# Patient Record
Sex: FEMALE | NOT HISPANIC OR LATINO | URBAN - METROPOLITAN AREA
[De-identification: names, ages, dates, MRNs, and addresses within clinical notes are randomized per-mention and may not be internally consistent; named-entity substitution may affect disease eponyms.]

---

## 2023-02-02 ENCOUNTER — AMBULATORY SURGICAL CENTER (OUTPATIENT)
Dept: URBAN - METROPOLITAN AREA SURGERY 12 | Facility: SURGERY | Age: 43
End: 2023-02-02

## 2023-02-02 ENCOUNTER — OFFICE (OUTPATIENT)
Dept: URBAN - METROPOLITAN AREA PATHOLOGY 2 | Facility: PATHOLOGY | Age: 43
End: 2023-02-02
Payer: COMMERCIAL

## 2023-02-02 VITALS
RESPIRATION RATE: 16 BRPM | WEIGHT: 152 LBS | RESPIRATION RATE: 17 BRPM | HEART RATE: 107 BPM | SYSTOLIC BLOOD PRESSURE: 130 MMHG | RESPIRATION RATE: 17 BRPM | TEMPERATURE: 97.4 F | OXYGEN SATURATION: 99 % | SYSTOLIC BLOOD PRESSURE: 124 MMHG | DIASTOLIC BLOOD PRESSURE: 74 MMHG | DIASTOLIC BLOOD PRESSURE: 68 MMHG | HEIGHT: 65 IN | SYSTOLIC BLOOD PRESSURE: 126 MMHG | HEART RATE: 107 BPM | SYSTOLIC BLOOD PRESSURE: 135 MMHG | DIASTOLIC BLOOD PRESSURE: 76 MMHG | WEIGHT: 152 LBS | HEART RATE: 105 BPM | DIASTOLIC BLOOD PRESSURE: 70 MMHG | DIASTOLIC BLOOD PRESSURE: 88 MMHG | OXYGEN SATURATION: 97 % | SYSTOLIC BLOOD PRESSURE: 123 MMHG | HEART RATE: 84 BPM | RESPIRATION RATE: 16 BRPM | RESPIRATION RATE: 20 BRPM | DIASTOLIC BLOOD PRESSURE: 75 MMHG | HEART RATE: 109 BPM | SYSTOLIC BLOOD PRESSURE: 121 MMHG | DIASTOLIC BLOOD PRESSURE: 74 MMHG | OXYGEN SATURATION: 98 % | HEART RATE: 72 BPM | DIASTOLIC BLOOD PRESSURE: 70 MMHG | SYSTOLIC BLOOD PRESSURE: 118 MMHG | SYSTOLIC BLOOD PRESSURE: 126 MMHG | DIASTOLIC BLOOD PRESSURE: 76 MMHG | DIASTOLIC BLOOD PRESSURE: 78 MMHG | DIASTOLIC BLOOD PRESSURE: 75 MMHG | RESPIRATION RATE: 15 BRPM | TEMPERATURE: 97.4 F | SYSTOLIC BLOOD PRESSURE: 124 MMHG | SYSTOLIC BLOOD PRESSURE: 135 MMHG | OXYGEN SATURATION: 98 % | SYSTOLIC BLOOD PRESSURE: 118 MMHG | SYSTOLIC BLOOD PRESSURE: 130 MMHG | SYSTOLIC BLOOD PRESSURE: 111 MMHG | HEART RATE: 79 BPM | RESPIRATION RATE: 15 BRPM | DIASTOLIC BLOOD PRESSURE: 68 MMHG | DIASTOLIC BLOOD PRESSURE: 78 MMHG | HEART RATE: 105 BPM | SYSTOLIC BLOOD PRESSURE: 111 MMHG | HEART RATE: 83 BPM | HEART RATE: 79 BPM | SYSTOLIC BLOOD PRESSURE: 123 MMHG | HEIGHT: 65 IN | HEART RATE: 84 BPM | HEART RATE: 109 BPM | HEART RATE: 72 BPM | OXYGEN SATURATION: 99 % | SYSTOLIC BLOOD PRESSURE: 121 MMHG | DIASTOLIC BLOOD PRESSURE: 77 MMHG | RESPIRATION RATE: 20 BRPM | RESPIRATION RATE: 11 BRPM | DIASTOLIC BLOOD PRESSURE: 88 MMHG | HEART RATE: 83 BPM | DIASTOLIC BLOOD PRESSURE: 77 MMHG | OXYGEN SATURATION: 97 % | RESPIRATION RATE: 11 BRPM

## 2023-02-02 DIAGNOSIS — K31.7 POLYP OF STOMACH AND DUODENUM: ICD-10-CM

## 2023-02-02 DIAGNOSIS — R13.12 DYSPHAGIA, OROPHARYNGEAL PHASE: ICD-10-CM

## 2023-02-02 DIAGNOSIS — K21.00 GASTRO-ESOPHAGEAL REFLUX DISEASE WITH ESOPHAGITIS, WITHOUT B: ICD-10-CM

## 2023-02-02 DIAGNOSIS — K21.9 GASTRO-ESOPHAGEAL REFLUX DISEASE WITHOUT ESOPHAGITIS: ICD-10-CM

## 2023-02-02 DIAGNOSIS — K31.89 OTHER DISEASES OF STOMACH AND DUODENUM: ICD-10-CM

## 2023-02-02 PROBLEM — R11.0 NAUSEA: Status: ACTIVE | Noted: 2023-02-02

## 2023-02-02 PROBLEM — R19.4 CHANGE IN BOWEL HABIT: Status: ACTIVE | Noted: 2023-02-02

## 2023-02-02 PROCEDURE — 43450 DILATE ESOPHAGUS 1/MULT PASS: CPT | Performed by: INTERNAL MEDICINE

## 2023-02-02 PROCEDURE — 43239 EGD BIOPSY SINGLE/MULTIPLE: CPT | Mod: 59 | Performed by: INTERNAL MEDICINE

## 2023-02-02 PROCEDURE — 88342 IMHCHEM/IMCYTCHM 1ST ANTB: CPT | Performed by: PATHOLOGY

## 2023-02-02 PROCEDURE — 43251 EGD REMOVE LESION SNARE: CPT | Performed by: INTERNAL MEDICINE

## 2023-02-02 PROCEDURE — 88305 TISSUE EXAM BY PATHOLOGIST: CPT | Performed by: PATHOLOGY

## 2023-02-02 PROCEDURE — 88313 SPECIAL STAINS GROUP 2: CPT | Performed by: PATHOLOGY

## 2023-05-11 ENCOUNTER — OFFICE VISIT (OUTPATIENT)
Dept: PRIMARY CARE | Facility: CLINIC | Age: 43
End: 2023-05-11
Payer: COMMERCIAL

## 2023-05-11 VITALS
RESPIRATION RATE: 14 BRPM | OXYGEN SATURATION: 97 % | HEART RATE: 89 BPM | SYSTOLIC BLOOD PRESSURE: 102 MMHG | DIASTOLIC BLOOD PRESSURE: 68 MMHG | TEMPERATURE: 97 F

## 2023-05-11 DIAGNOSIS — J30.2 SEASONAL ALLERGIC RHINITIS, UNSPECIFIED TRIGGER: Primary | ICD-10-CM

## 2023-05-11 PROCEDURE — 99213 OFFICE O/P EST LOW 20 MIN: CPT | Performed by: FAMILY MEDICINE

## 2023-05-11 PROCEDURE — 1036F TOBACCO NON-USER: CPT | Performed by: FAMILY MEDICINE

## 2023-05-11 RX ORDER — BUPROPION HYDROCHLORIDE 100 MG/1
100 TABLET, EXTENDED RELEASE ORAL
COMMUNITY
Start: 2023-05-09

## 2023-05-11 RX ORDER — NORETHINDRONE ACETATE AND ETHINYL ESTRADIOL AND FERROUS FUMARATE 1.5-30(21)
KIT ORAL
COMMUNITY
Start: 2023-03-31

## 2023-05-11 RX ORDER — OMEPRAZOLE 40 MG/1
40 CAPSULE, DELAYED RELEASE ORAL DAILY
COMMUNITY
Start: 2023-02-20

## 2023-05-11 RX ORDER — VALACYCLOVIR HYDROCHLORIDE 500 MG/1
TABLET, FILM COATED ORAL
COMMUNITY

## 2023-05-11 RX ORDER — SULFASALAZINE 500 MG/1
1500 TABLET ORAL 2 TIMES DAILY
COMMUNITY

## 2023-12-02 ENCOUNTER — TELEPHONE (OUTPATIENT)
Dept: PRIMARY CARE | Facility: CLINIC | Age: 43
End: 2023-12-02
Payer: COMMERCIAL

## 2023-12-02 DIAGNOSIS — U07.1 COVID-19: Primary | ICD-10-CM

## 2023-12-02 NOTE — TELEPHONE ENCOUNTER
Covid (+) today    Sxs started 11/27  . Congestion, fatigue ,malaise . On med for Infl Bowel.  Denies sob, wheezing, chest pain .    Interested in tx options .     Covid 19 infection  diagnosis discussed, including symptoms, expected duration ,  treatment , isolation and masking.    Questions addressed  . We discussed sxs / signs to watch for and reasons to seek emergent care.  Paxlovid reviewed r/b/a reviewed.   Rx ordered

## 2024-03-28 ENCOUNTER — OFFICE VISIT (OUTPATIENT)
Dept: PRIMARY CARE | Facility: CLINIC | Age: 44
End: 2024-03-28
Payer: COMMERCIAL

## 2024-03-28 VITALS
TEMPERATURE: 98 F | SYSTOLIC BLOOD PRESSURE: 121 MMHG | HEART RATE: 87 BPM | RESPIRATION RATE: 16 BRPM | DIASTOLIC BLOOD PRESSURE: 76 MMHG | OXYGEN SATURATION: 96 %

## 2024-03-28 DIAGNOSIS — J01.80 ACUTE NON-RECURRENT SINUSITIS OF OTHER SINUS: Primary | ICD-10-CM

## 2024-03-28 PROBLEM — E55.9 VITAMIN D DEFICIENCY: Status: ACTIVE | Noted: 2024-03-28

## 2024-03-28 PROBLEM — K21.9 GASTRO-ESOPHAGEAL REFLUX DISEASE WITHOUT ESOPHAGITIS: Status: ACTIVE | Noted: 2023-02-02

## 2024-03-28 PROCEDURE — 99214 OFFICE O/P EST MOD 30 MIN: CPT | Performed by: FAMILY MEDICINE

## 2024-03-28 RX ORDER — DOXYCYCLINE 100 MG/1
100 CAPSULE ORAL 2 TIMES DAILY
Qty: 20 CAPSULE | Refills: 0 | Status: SHIPPED | OUTPATIENT
Start: 2024-03-28 | End: 2024-04-07

## 2024-03-28 ASSESSMENT — ENCOUNTER SYMPTOMS
FEVER: 0
NECK PAIN: 1
TROUBLE SWALLOWING: 1
SORE THROAT: 1
RHINORRHEA: 1
COUGH: 0
SINUS PRESSURE: 1

## 2024-03-28 NOTE — PROGRESS NOTES
Subjective   Patient ID: Lashonda Bowie is a 44 y.o. female who presents for Sinusitis.    For the last 3 days she has has sinus drainage, sore throat, sore in the neck and hard to swallow, also having congestion.   Mucus and blood came out of her nose today.   Mucus was a green color.     Has taken Tylenol, Ibuprofen and cough syrup     She was with her nephew recently who did test positive for strep throat. Her 2 daughters are beginning to show symptoms as well. She does not think she has spiked a fever. Mild sinus pressure, her jaw is not bothering her.         Review of Systems   Constitutional:  Negative for fever.   HENT:  Positive for congestion, ear pain (pressure), postnasal drip, rhinorrhea (green with some blood), sinus pressure (mild), sore throat and trouble swallowing.    Respiratory:  Negative for cough.    Musculoskeletal:  Positive for neck pain.       Objective   /76 (BP Location: Right arm, Patient Position: Sitting, BP Cuff Size: Adult)   Pulse 87   Temp 36.7 °C (98 °F) (Temporal)   Resp 16   SpO2 96%     Physical Exam  Constitutional:       Appearance: Normal appearance.   HENT:      Right Ear: Tympanic membrane is erythematous.      Left Ear: Tympanic membrane is erythematous.      Nose: Congestion and rhinorrhea present.      Mouth/Throat:      Mouth: Mucous membranes are moist.      Pharynx: Posterior oropharyngeal erythema present.   Neurological:      General: No focal deficit present.      Mental Status: She is alert.   Psychiatric:         Mood and Affect: Mood normal.         Behavior: Behavior normal.         Thought Content: Thought content normal.         Judgment: Judgment normal.         Assessment/Plan   Problem List Items Addressed This Visit       Other acute sinusitis - Primary     Symptoms x3 days.  Currently using Tylenol, Ibuprofen, and cough syrup.  Begin taking doxycycline 100 mg BID for 10 days.  Can interact with family for holiday after 24 hours on antibiotic.          Relevant Medications    doxycycline (Vibramycin) 100 mg capsule       Scribe Attestation  By signing my name below, I, Clyde Stubbs   attest that this documentation has been prepared under the direction and in the presence of Myke Osborne MD.

## 2024-03-28 NOTE — ASSESSMENT & PLAN NOTE
Symptoms x3 days.  Currently using Tylenol, Ibuprofen, and cough syrup.  Begin taking doxycycline 100 mg BID for 10 days.  Can interact with family for holiday after 24 hours on antibiotic.

## 2024-06-28 ENCOUNTER — OFFICE VISIT (OUTPATIENT)
Dept: PRIMARY CARE | Facility: CLINIC | Age: 44
End: 2024-06-28
Payer: COMMERCIAL

## 2024-06-28 ENCOUNTER — LAB (OUTPATIENT)
Dept: LAB | Facility: LAB | Age: 44
End: 2024-06-28
Payer: COMMERCIAL

## 2024-06-28 VITALS
OXYGEN SATURATION: 98 % | BODY MASS INDEX: 26.79 KG/M2 | SYSTOLIC BLOOD PRESSURE: 109 MMHG | TEMPERATURE: 97.8 F | WEIGHT: 161 LBS | HEART RATE: 94 BPM | DIASTOLIC BLOOD PRESSURE: 73 MMHG | RESPIRATION RATE: 16 BRPM

## 2024-06-28 DIAGNOSIS — M79.10 MYALGIA: ICD-10-CM

## 2024-06-28 DIAGNOSIS — M25.662 JOINT STIFFNESS OF KNEE, LEFT: ICD-10-CM

## 2024-06-28 DIAGNOSIS — R53.83 FATIGUE, UNSPECIFIED TYPE: ICD-10-CM

## 2024-06-28 DIAGNOSIS — M25.621 JOINT STIFFNESS OF ELBOW, RIGHT: ICD-10-CM

## 2024-06-28 DIAGNOSIS — M25.621 JOINT STIFFNESS OF ELBOW, RIGHT: Primary | ICD-10-CM

## 2024-06-28 PROCEDURE — 86617 LYME DISEASE ANTIBODY: CPT

## 2024-06-28 PROCEDURE — 1036F TOBACCO NON-USER: CPT | Performed by: FAMILY MEDICINE

## 2024-06-28 PROCEDURE — 36415 COLL VENOUS BLD VENIPUNCTURE: CPT

## 2024-06-28 PROCEDURE — 99213 OFFICE O/P EST LOW 20 MIN: CPT | Performed by: FAMILY MEDICINE

## 2024-06-28 PROCEDURE — 86747 PARVOVIRUS ANTIBODY: CPT

## 2024-06-28 PROCEDURE — 86618 LYME DISEASE ANTIBODY: CPT

## 2024-06-28 RX ORDER — PREDNISONE 10 MG/1
TABLET ORAL
Qty: 20 TABLET | Refills: 0 | Status: SHIPPED | OUTPATIENT
Start: 2024-06-28

## 2024-06-28 RX ORDER — IBUPROFEN 800 MG/1
800 TABLET ORAL 3 TIMES DAILY PRN
Qty: 20 TABLET | Refills: 0 | Status: SHIPPED | OUTPATIENT
Start: 2024-06-28

## 2024-06-28 ASSESSMENT — ENCOUNTER SYMPTOMS
FATIGUE: 1
CHILLS: 1
FEVER: 0
ARTHRALGIAS: 1
COLOR CHANGE: 0
JOINT SWELLING: 1
MYALGIAS: 1

## 2024-06-28 NOTE — PROGRESS NOTES
Subjective   Patient ID: Lashonda Bowie is a 44 y.o. female who presents for stiff (Still all over /Kids had 5th disease /Knees hard to bend it /Hard straighten arms ) and Facial Swelling (Swelling all over x 6 days ).    HPI     Here today for joint stiffness-  States her kids recently had [id number]- diagnosed ~2 weeks ago- had rash   She started with symptoms on 6/22- felt like she got hit by a truck   No fever  +chills  Fatigue   Lacy rash on her legs   She feels stiff, joint pain all over - mostly L knee and R elbow   Feels swollen - hands- trouble getting rings on   Muscles sores   Left knee swollen   She has UC and is on sulfasalazine   Taking advil 400 mg 3 times per day now   No tick bites   Leaving for vacation next week, would like prednisone     Current Outpatient Medications   Medication Sig Dispense Refill    Maria Torres 1.5/30, 28, 1.5 mg-30 mcg (21)/75 mg (7) tablet TAKE 1 TABLET BY MOUTH EVERY DAY FOR 28 DAYS      omeprazole (PriLOSEC) 40 mg DR capsule Take 1 capsule (40 mg) by mouth once daily.      sulfaSALAzine (Azulfidine) 500 mg tablet Take 3 tablets (1,500 mg) by mouth 2 times a day.      valACYclovir (Valtrex) 500 mg tablet 2 TABLETS ORALLY TWICE A DAY , AS NEEDED 5 DAYS      ibuprofen 800 mg tablet Take 1 tablet (800 mg) by mouth 3 times a day as needed (pain). 20 tablet 0    predniSONE (Deltasone) 10 mg tablet Take 4 tabs daily for 2 days, then 3 tabs daily for 2 days, then 2 tabs daily for 2 days, then 1 tab daily for 2 days 20 tablet 0     No current facility-administered medications for this visit.       Review of Systems   Constitutional:  Positive for chills and fatigue. Negative for fever.   Musculoskeletal:  Positive for arthralgias, joint swelling and myalgias.   Skin:  Negative for color change and rash.           Objective   /73 (BP Location: Right arm, Patient Position: Sitting, BP Cuff Size: Adult)   Pulse 94   Temp 36.6 °C (97.8 °F) (Temporal)   Resp 16   Wt 73 kg (161 lb)    LMP 06/16/2024 (Exact Date)   SpO2 98%   BMI 26.79 kg/m²     Physical Exam    Constitutional: Well developed, well nourished, alert and in no acute distress   Eyes: Normal external exam.   Cardiovascular: Regular rate and rhythm, normal S1 and S2, no murmurs, gallops, or rubs. Radial pulses normal. No peripheral edema.  Pulmonary: No respiratory distress, lungs clear to auscultation bilaterally. No wheezes, rhonchi, rales.  Extremities: Normal ROM elbows, wrists, knees.  Slightly stiff.  No obvious swelling, erythema, or warmth.    Skin: Warm, well perfused, normal skin turgor and color.   Neurologic: Cranial nerves II-XII grossly intact.   Psychiatric: Mood calm and affect normal.      Assessment/Plan   Problem List Items Addressed This Visit    None  Visit Diagnoses         Codes    Joint stiffness of elbow, right    -  Primary M25.621    Relevant Medications    predniSONE (Deltasone) 10 mg tablet    ibuprofen 800 mg tablet    Other Relevant Orders    Parvovirus B19 Antibody, IgG IgM    LYME (B. BURGDORFERI) AB MODIFIED 2-TITER TESTING, WITH REFLEX TO IGM AND IGG BY SOREN    Joint stiffness of knee, left     M25.662    Relevant Medications    predniSONE (Deltasone) 10 mg tablet    ibuprofen 800 mg tablet    Other Relevant Orders    Parvovirus B19 Antibody, IgG IgM    LYME (B. BURGDORFERI) AB MODIFIED 2-TITER TESTING, WITH REFLEX TO IGM AND IGG BY SOREN    Myalgia     M79.10    Fatigue, unspecified type     R53.83          Suspect [id number] due to positive exposure in her children-   Discussed viral process  Supportive care advised- increase ibuprofen 800 mg TID   Will rx prednisone as well for symptom management    Catia Zaman,   6/28/2024

## 2024-07-01 LAB
B BURGDOR IGG SERPL QL IA: 0.29 IV
B BURGDOR IGG+IGM SER IA-IMP: POSITIVE
B BURGDOR IGM SERPL QL IA: 0.97 IV
B BURGDOR.VLSE1+PEPC10 AB SER IA-ACNC: 0.91 IV

## 2024-07-02 DIAGNOSIS — A69.23 LYME ARTHRITIS (MULTI): Primary | ICD-10-CM

## 2024-07-02 DIAGNOSIS — A69.23 LYME ARTHRITIS (MULTI): ICD-10-CM

## 2024-07-02 DIAGNOSIS — M01.X0: ICD-10-CM

## 2024-07-02 DIAGNOSIS — B34.3: ICD-10-CM

## 2024-07-02 LAB
B19V IGG SER IA-ACNC: 4.63 IV
B19V IGM SER IA-ACNC: 24.68 IV

## 2024-07-02 RX ORDER — DOXYCYCLINE 100 MG/1
100 CAPSULE ORAL 2 TIMES DAILY
Qty: 56 CAPSULE | Refills: 0 | Status: SHIPPED | OUTPATIENT
Start: 2024-07-02 | End: 2024-07-02 | Stop reason: SDUPTHER

## 2024-07-02 RX ORDER — DOXYCYCLINE 100 MG/1
100 CAPSULE ORAL 2 TIMES DAILY
Qty: 56 CAPSULE | Refills: 0 | Status: SHIPPED | OUTPATIENT
Start: 2024-07-02 | End: 2024-07-30

## 2024-07-05 ENCOUNTER — TELEPHONE (OUTPATIENT)
Dept: PRIMARY CARE | Facility: CLINIC | Age: 44
End: 2024-07-05
Payer: COMMERCIAL

## 2024-07-05 DIAGNOSIS — M54.9 UPPER BACK PAIN ON LEFT SIDE: Primary | ICD-10-CM

## 2024-07-05 RX ORDER — CYCLOBENZAPRINE HCL 5 MG
5 TABLET ORAL 3 TIMES DAILY PRN
Qty: 20 TABLET | Refills: 0 | Status: SHIPPED | OUTPATIENT
Start: 2024-07-05 | End: 2024-09-03

## 2024-07-05 NOTE — TELEPHONE ENCOUNTER
Patient is requesting PT referral for pain from pinched nerve    States prednisone is not helping     She would like a call from Dr. Zaman today regarding symptoms as well

## 2024-07-05 NOTE — TELEPHONE ENCOUNTER
Spoke to patient   She is concerned for a pinched nerve in her left upper back  Please fax PT referral to [facility]man gonz[facility]   Address: [address], [place] [address]  Phone: [phone]

## 2024-07-10 ENCOUNTER — TELEPHONE (OUTPATIENT)
Dept: PRIMARY CARE | Facility: CLINIC | Age: 44
End: 2024-07-10
Payer: COMMERCIAL

## 2024-07-10 NOTE — TELEPHONE ENCOUNTER
Patient called and is asking for the information to the infectious disease doctor that you were recommending she go to.

## 2024-07-10 NOTE — TELEPHONE ENCOUNTER
ID referral placed, I didn't have a specific doctor in mind.   Any of our  infectious disease specialists are fine.

## 2024-07-11 NOTE — TELEPHONE ENCOUNTER
Pt cb. Advised her referral was placed. Pt is going to check with insurance first before scheduling. Pt did take Dora's number to cab. Pt had no further questions/concerns.

## 2024-07-12 ENCOUNTER — TELEPHONE (OUTPATIENT)
Dept: PRIMARY CARE | Facility: CLINIC | Age: 44
End: 2024-07-12
Payer: COMMERCIAL

## 2024-07-12 DIAGNOSIS — A69.20 LYME DISEASE: Primary | ICD-10-CM

## 2024-07-12 NOTE — TELEPHONE ENCOUNTER
----- Message from Dora TADEO sent at 7/12/2024 11:54 AM EDT -----  Regarding: ID referral  Pt called to schedule ID appt. Lyme disease dx requires a referral for scheduling. Advised pt that dept schedules this appt directly. She was given scheduling phone # [phone].

## 2024-08-20 ENCOUNTER — TELEPHONE (OUTPATIENT)
Dept: PRIMARY CARE | Facility: CLINIC | Age: 44
End: 2024-08-20
Payer: COMMERCIAL

## 2024-08-20 DIAGNOSIS — Z00.00 WELLNESS EXAMINATION: Primary | ICD-10-CM

## 2024-08-20 DIAGNOSIS — E55.9 VITAMIN D DEFICIENCY: ICD-10-CM

## 2024-08-20 PROBLEM — J01.80 OTHER ACUTE SINUSITIS: Status: RESOLVED | Noted: 2024-03-28 | Resolved: 2024-08-20

## 2024-08-20 NOTE — TELEPHONE ENCOUNTER
Patient has CPE 10/15    Asking for routine blood work to be put in prior, including annual colitis work up

## 2024-08-29 ENCOUNTER — PATIENT MESSAGE (OUTPATIENT)
Dept: INFECTIOUS DISEASES | Facility: CLINIC | Age: 44
End: 2024-08-29
Payer: COMMERCIAL

## 2024-08-29 DIAGNOSIS — A69.20 LYME DISEASE: Primary | ICD-10-CM

## 2024-08-30 ENCOUNTER — LAB (OUTPATIENT)
Dept: LAB | Facility: LAB | Age: 44
End: 2024-08-30
Payer: COMMERCIAL

## 2024-08-30 DIAGNOSIS — R53.83 FATIGUE, UNSPECIFIED TYPE: Primary | ICD-10-CM

## 2024-08-30 DIAGNOSIS — R53.83 FATIGUE, UNSPECIFIED TYPE: ICD-10-CM

## 2024-08-30 DIAGNOSIS — E55.9 VITAMIN D DEFICIENCY: ICD-10-CM

## 2024-08-30 DIAGNOSIS — A69.20 LYME DISEASE: ICD-10-CM

## 2024-08-30 DIAGNOSIS — Z00.00 WELLNESS EXAMINATION: ICD-10-CM

## 2024-08-30 LAB
25(OH)D3 SERPL-MCNC: 28 NG/ML (ref 30–100)
ALBUMIN SERPL BCP-MCNC: 4 G/DL (ref 3.4–5)
ALP SERPL-CCNC: 61 U/L (ref 33–110)
ALT SERPL W P-5'-P-CCNC: 11 U/L (ref 7–45)
ANION GAP SERPL CALC-SCNC: 11 MMOL/L (ref 10–20)
AST SERPL W P-5'-P-CCNC: 13 U/L (ref 9–39)
BILIRUB SERPL-MCNC: 0.4 MG/DL (ref 0–1.2)
BUN SERPL-MCNC: 12 MG/DL (ref 6–23)
CALCIUM SERPL-MCNC: 9.3 MG/DL (ref 8.6–10.6)
CHLORIDE SERPL-SCNC: 106 MMOL/L (ref 98–107)
CHOLEST SERPL-MCNC: 188 MG/DL (ref 0–199)
CHOLESTEROL/HDL RATIO: 3.8
CO2 SERPL-SCNC: 27 MMOL/L (ref 21–32)
CREAT SERPL-MCNC: 0.66 MG/DL (ref 0.5–1.05)
CRP SERPL-MCNC: 0.72 MG/DL
EGFRCR SERPLBLD CKD-EPI 2021: >90 ML/MIN/1.73M*2
ERYTHROCYTE [DISTWIDTH] IN BLOOD BY AUTOMATED COUNT: 12.2 % (ref 11.5–14.5)
GLUCOSE SERPL-MCNC: 79 MG/DL (ref 74–99)
HCT VFR BLD AUTO: 35.5 % (ref 36–46)
HDLC SERPL-MCNC: 48.9 MG/DL
HGB BLD-MCNC: 11.8 G/DL (ref 12–16)
LDLC SERPL CALC-MCNC: 104 MG/DL
MCH RBC QN AUTO: 28.6 PG (ref 26–34)
MCHC RBC AUTO-ENTMCNC: 33.2 G/DL (ref 32–36)
MCV RBC AUTO: 86 FL (ref 80–100)
NON HDL CHOLESTEROL: 139 MG/DL (ref 0–149)
NRBC BLD-RTO: 0 /100 WBCS (ref 0–0)
PLATELET # BLD AUTO: 273 X10*3/UL (ref 150–450)
POTASSIUM SERPL-SCNC: 4.3 MMOL/L (ref 3.5–5.3)
PROT SERPL-MCNC: 6.8 G/DL (ref 6.4–8.2)
RBC # BLD AUTO: 4.12 X10*6/UL (ref 4–5.2)
SODIUM SERPL-SCNC: 140 MMOL/L (ref 136–145)
TRIGL SERPL-MCNC: 178 MG/DL (ref 0–149)
VIT B12 SERPL-MCNC: 201 PG/ML (ref 211–911)
VLDL: 36 MG/DL (ref 0–40)
WBC # BLD AUTO: 5.6 X10*3/UL (ref 4.4–11.3)

## 2024-08-30 PROCEDURE — 36415 COLL VENOUS BLD VENIPUNCTURE: CPT

## 2024-08-30 PROCEDURE — 83540 ASSAY OF IRON: CPT

## 2024-08-30 PROCEDURE — 85027 COMPLETE CBC AUTOMATED: CPT

## 2024-08-30 PROCEDURE — 80061 LIPID PANEL: CPT

## 2024-08-30 PROCEDURE — 86140 C-REACTIVE PROTEIN: CPT

## 2024-08-30 PROCEDURE — 83550 IRON BINDING TEST: CPT

## 2024-08-30 PROCEDURE — 86618 LYME DISEASE ANTIBODY: CPT

## 2024-08-30 PROCEDURE — 82306 VITAMIN D 25 HYDROXY: CPT

## 2024-08-30 PROCEDURE — 82728 ASSAY OF FERRITIN: CPT

## 2024-08-30 PROCEDURE — 80053 COMPREHEN METABOLIC PANEL: CPT

## 2024-08-30 PROCEDURE — 82607 VITAMIN B-12: CPT

## 2024-09-01 LAB — B BURGDOR.VLSE1+PEPC10 AB SER IA-ACNC: 0.51 IV

## 2024-09-03 ENCOUNTER — APPOINTMENT (OUTPATIENT)
Dept: INFECTIOUS DISEASES | Facility: CLINIC | Age: 44
End: 2024-09-03
Payer: COMMERCIAL

## 2024-09-03 VITALS
HEIGHT: 65 IN | DIASTOLIC BLOOD PRESSURE: 80 MMHG | SYSTOLIC BLOOD PRESSURE: 129 MMHG | BODY MASS INDEX: 27.82 KG/M2 | TEMPERATURE: 98 F | WEIGHT: 167 LBS | HEART RATE: 86 BPM

## 2024-09-03 DIAGNOSIS — A69.20 LYME DISEASE: Primary | ICD-10-CM

## 2024-09-03 DIAGNOSIS — E53.8 B12 DEFICIENCY: ICD-10-CM

## 2024-09-03 DIAGNOSIS — D50.8 OTHER IRON DEFICIENCY ANEMIA: ICD-10-CM

## 2024-09-03 LAB
FERRITIN SERPL-MCNC: 45 NG/ML (ref 8–150)
IRON SATN MFR SERPL: 35 % (ref 25–45)
IRON SERPL-MCNC: 118 UG/DL (ref 35–150)
TIBC SERPL-MCNC: 342 UG/DL (ref 240–445)
UIBC SERPL-MCNC: 224 UG/DL (ref 110–370)

## 2024-09-03 PROCEDURE — 3008F BODY MASS INDEX DOCD: CPT | Performed by: INTERNAL MEDICINE

## 2024-09-03 PROCEDURE — 99204 OFFICE O/P NEW MOD 45 MIN: CPT | Performed by: INTERNAL MEDICINE

## 2024-09-03 PROCEDURE — 96372 THER/PROPH/DIAG INJ SC/IM: CPT | Performed by: INTERNAL MEDICINE

## 2024-09-03 RX ORDER — CYANOCOBALAMIN 1000 UG/ML
1000 INJECTION, SOLUTION INTRAMUSCULAR; SUBCUTANEOUS ONCE
Status: COMPLETED | OUTPATIENT
Start: 2024-09-03 | End: 2024-09-03

## 2024-09-03 NOTE — PROGRESS NOTES
Prior to seeing the patient we reviewed all the recent records in [facility] including labs imaging notes family history social history and past medical history  Patient was referred for question of Lyme disease.  In late June she developed an arthritis syndrome with initial pain and stiffness in her right elbow followed by her left knee.   Around this time Eder had an illness consistent with parvovirus and she developed a typical lacy rash.  She had parvovirus B19 serologies at the end of June which showed a very high IgM and a lower IgG.  Around that time she also had Lyme serologies which showed antibody levels just above the cutoff.  She received 30 days of doxycycline .  her arthritis syndrome responded to tincture of time and prednisone.  Currently she has a little bit of knee stiffness but otherwise no joint pain no rash no fevers chills nausea vomiting diarrhea.  She contacted us by epic message ahead of the visit as to whether we should repeat some of the labs.  The Lyme serologies were repeated and are now below the cutoff for IgG.  She has had some issues with lack of energy and malaise and we added a B12 level-discussed below.    We also did a CRP which was normal    On exam the patient appeared well.  Normal mental status normal mentation no rash no joint swelling    Assessment-I told the patient it was unclear if she had had Lyme disease.  As the patient appropriately asked, her very active parvovirus B19 infection might have influenced her June Lyme serologies which were right on the borderline and are now below the IgG cutoff for Lyme disease.  She got 30 days of doxycycline which is very appropriately aggressive therapy so I do think we can put the Lyme disease issue to rest as she was treated appropriately and there should not be any active Lyme disease.  I cannot tell her for sure if she had Lyme disease in the past.     Her Parvo serology was highly suggestive of acute Parvovirus B19 infection  in June, and while not all that common we have seen many healthy young adults have a significant arthritis syndrome for several weeks following acute Parvovirus B19 infection, and her case is somewhat typical for that syndrome.  As part of her workup we did a vitamin B12 level which was significantly low.  We gave her an injection in clinic today and discussed supplementation options, either oral or injection.  She also has a mild anemia we added on iron studies today.  We will call her with these results

## 2024-09-03 NOTE — PATIENT INSTRUCTIONS
Your b12 is low- you can treat with injections or over the counter B12- 500 or 1000 mcg daily  I will call with our iron results  No additional treatment for Lyme is needed

## 2024-09-03 NOTE — LETTER
09/03/24    Myke Osborne MD  [address]  [place] [facility] [address]      Dear Dr. Myke Osborne MD,    I am writing to confirm that your patient, Lashonda Bowie, received care in my office on 09/03/24. I have enclosed a summary of the care provided to Lashonda for your reference.    Please contact me with any questions you may have regarding the visit.    Sincerely,         Douglas Bose MD  [address]  [facility] [address]    CC: No Recipients

## 2024-09-03 NOTE — LETTER
09/03/24    Catia Zaman DO  [address]  [facility]  [facility] [address]      Dear Dr. Catia Zaman DO,    Thank you for referring your patient, Lashonda Bowie, to receive care in my office. I have enclosed a summary of the care provided to Lashonda on 09/03/24.    Please contact me with any questions you may have regarding the visit.    Sincerely,         Douglas Bose MD  [address] [facility] [address]  [facility] [address]    CC: No Recipients

## 2024-09-09 ENCOUNTER — TELEPHONE (OUTPATIENT)
Dept: PRIMARY CARE | Facility: CLINIC | Age: 44
End: 2024-09-09
Payer: COMMERCIAL

## 2024-09-09 NOTE — TELEPHONE ENCOUNTER
Pt seen Dr. Bose and was told her B12 was low. He advised her she either get B12 injections or take over the counter B12 500-100 mcg daily. Pt states she has an issue taking pills due to her stomach. Pt wants to know if you would order the B12 shots?

## 2024-09-10 DIAGNOSIS — D64.9 ANEMIA, UNSPECIFIED TYPE: ICD-10-CM

## 2024-09-10 DIAGNOSIS — E53.8 B12 DEFICIENCY: Primary | ICD-10-CM

## 2024-09-10 RX ORDER — CYANOCOBALAMIN 1000 UG/ML
1000 INJECTION, SOLUTION INTRAMUSCULAR; SUBCUTANEOUS
Status: SHIPPED | OUTPATIENT
Start: 2024-09-10 | End: 2024-10-01

## 2024-09-10 NOTE — TELEPHONE ENCOUNTER
Spoke with patient, she stated that she needs to get them weekly.   Last injection was Tuesday.     Are you okay to take over ordering?

## 2024-09-10 NOTE — TELEPHONE ENCOUNTER
If just starting recommend weekly for one month then maintain with monthly  We will check levels to confirm this keeps levels good  Order in for weekly x3 then move to monthly  Repeat b12 level 1 month and 3 months

## 2024-09-13 ENCOUNTER — CLINICAL SUPPORT (OUTPATIENT)
Dept: PRIMARY CARE | Facility: CLINIC | Age: 44
End: 2024-09-13
Payer: COMMERCIAL

## 2024-09-13 ENCOUNTER — TELEPHONE (OUTPATIENT)
Dept: PRIMARY CARE | Facility: CLINIC | Age: 44
End: 2024-09-13

## 2024-09-13 DIAGNOSIS — D64.9 ANEMIA, UNSPECIFIED TYPE: ICD-10-CM

## 2024-09-13 PROCEDURE — 96372 THER/PROPH/DIAG INJ SC/IM: CPT | Performed by: FAMILY MEDICINE

## 2024-09-13 NOTE — TELEPHONE ENCOUNTER
Patient came in for her weekly b-12 shot. Just wonder when she should get the blood work done again?

## 2024-09-20 ENCOUNTER — APPOINTMENT (OUTPATIENT)
Dept: PRIMARY CARE | Facility: CLINIC | Age: 44
End: 2024-09-20
Payer: COMMERCIAL

## 2024-09-20 PROCEDURE — 96372 THER/PROPH/DIAG INJ SC/IM: CPT | Performed by: FAMILY MEDICINE

## 2024-09-27 ENCOUNTER — TELEPHONE (OUTPATIENT)
Dept: PRIMARY CARE | Facility: CLINIC | Age: 44
End: 2024-09-27

## 2024-09-27 ENCOUNTER — APPOINTMENT (OUTPATIENT)
Dept: PRIMARY CARE | Facility: CLINIC | Age: 44
End: 2024-09-27
Payer: COMMERCIAL

## 2024-09-27 DIAGNOSIS — E53.8 B12 DEFICIENCY: ICD-10-CM

## 2024-09-27 DIAGNOSIS — K51.919 ULCERATIVE COLITIS WITH COMPLICATION, UNSPECIFIED LOCATION (MULTI): ICD-10-CM

## 2024-09-27 DIAGNOSIS — R53.83 FATIGUE, UNSPECIFIED TYPE: Primary | ICD-10-CM

## 2024-09-27 DIAGNOSIS — E55.9 VITAMIN D DEFICIENCY: ICD-10-CM

## 2024-09-27 PROCEDURE — 96372 THER/PROPH/DIAG INJ SC/IM: CPT | Performed by: FAMILY MEDICINE

## 2024-09-27 NOTE — TELEPHONE ENCOUNTER
Patient getting blood work done next week to check her B12 level.  She is wondering all her vitamin levels checked as well.  She is aware Dr. Osborne is out until 10/01.

## 2024-10-04 ENCOUNTER — LAB (OUTPATIENT)
Dept: LAB | Facility: LAB | Age: 44
End: 2024-10-04
Payer: COMMERCIAL

## 2024-10-04 DIAGNOSIS — K51.919 ULCERATIVE COLITIS WITH COMPLICATION, UNSPECIFIED LOCATION (MULTI): ICD-10-CM

## 2024-10-04 DIAGNOSIS — R53.83 FATIGUE, UNSPECIFIED TYPE: ICD-10-CM

## 2024-10-04 DIAGNOSIS — E53.8 B12 DEFICIENCY: ICD-10-CM

## 2024-10-04 DIAGNOSIS — E55.9 VITAMIN D DEFICIENCY: ICD-10-CM

## 2024-10-04 LAB
25(OH)D3 SERPL-MCNC: 31 NG/ML (ref 30–100)
VIT B12 SERPL-MCNC: 416 PG/ML (ref 211–911)

## 2024-10-04 PROCEDURE — 84630 ASSAY OF ZINC: CPT

## 2024-10-04 PROCEDURE — 82306 VITAMIN D 25 HYDROXY: CPT

## 2024-10-04 PROCEDURE — 84590 ASSAY OF VITAMIN A: CPT

## 2024-10-04 PROCEDURE — 82607 VITAMIN B-12: CPT

## 2024-10-04 PROCEDURE — 36415 COLL VENOUS BLD VENIPUNCTURE: CPT

## 2024-10-04 PROCEDURE — 84446 ASSAY OF VITAMIN E: CPT

## 2024-10-08 LAB — ZINC SERPL-MCNC: 69.4 UG/DL (ref 60–120)

## 2024-10-09 LAB
A-TOCOPHEROL VIT E SERPL-MCNC: 8.6 MG/L (ref 5.5–18)
ANNOTATION COMMENT IMP: NORMAL
BETA+GAMMA TOCOPHEROL SERPL-MCNC: 1.2 MG/L (ref 0–6)
RETINYL PALMITATE SERPL-MCNC: <0.02 MG/L (ref 0–0.1)
VIT A SERPL-MCNC: 0.55 MG/L (ref 0.3–1.2)

## 2024-10-15 ENCOUNTER — APPOINTMENT (OUTPATIENT)
Dept: PRIMARY CARE | Facility: CLINIC | Age: 44
End: 2024-10-15
Payer: COMMERCIAL

## 2024-10-15 VITALS
RESPIRATION RATE: 14 BRPM | DIASTOLIC BLOOD PRESSURE: 77 MMHG | OXYGEN SATURATION: 98 % | SYSTOLIC BLOOD PRESSURE: 116 MMHG | BODY MASS INDEX: 27.94 KG/M2 | TEMPERATURE: 99.3 F | WEIGHT: 167.7 LBS | HEIGHT: 65 IN | HEART RATE: 83 BPM

## 2024-10-15 DIAGNOSIS — E53.8 B12 DEFICIENCY: Primary | ICD-10-CM

## 2024-10-15 DIAGNOSIS — E55.9 VITAMIN D DEFICIENCY: ICD-10-CM

## 2024-10-15 DIAGNOSIS — Z00.00 WELLNESS EXAMINATION: ICD-10-CM

## 2024-10-15 DIAGNOSIS — R63.5 WEIGHT GAIN: ICD-10-CM

## 2024-10-15 DIAGNOSIS — R51.9 CHRONIC DAILY HEADACHE: ICD-10-CM

## 2024-10-15 DIAGNOSIS — K51.90 ULCERATIVE COLITIS WITHOUT COMPLICATIONS, UNSPECIFIED LOCATION (MULTI): ICD-10-CM

## 2024-10-15 DIAGNOSIS — K21.9 GASTRO-ESOPHAGEAL REFLUX DISEASE WITHOUT ESOPHAGITIS: ICD-10-CM

## 2024-10-15 PROCEDURE — 99396 PREV VISIT EST AGE 40-64: CPT | Performed by: FAMILY MEDICINE

## 2024-10-15 PROCEDURE — 3008F BODY MASS INDEX DOCD: CPT | Performed by: FAMILY MEDICINE

## 2024-10-15 RX ORDER — CYANOCOBALAMIN 1000 UG/ML
1000 INJECTION, SOLUTION INTRAMUSCULAR; SUBCUTANEOUS
Status: SHIPPED | OUTPATIENT
Start: 2024-10-25

## 2024-10-15 RX ORDER — OMEPRAZOLE 20 MG/1
20 CAPSULE, DELAYED RELEASE ORAL
COMMUNITY

## 2024-10-15 ASSESSMENT — PATIENT HEALTH QUESTIONNAIRE - PHQ9
1. LITTLE INTEREST OR PLEASURE IN DOING THINGS: NOT AT ALL
2. FEELING DOWN, DEPRESSED OR HOPELESS: NOT AT ALL
SUM OF ALL RESPONSES TO PHQ9 QUESTIONS 1 AND 2: 0

## 2024-10-15 ASSESSMENT — ENCOUNTER SYMPTOMS
RESPIRATORY NEGATIVE: 1
GASTROINTESTINAL NEGATIVE: 1
UNEXPECTED WEIGHT CHANGE: 1
PSYCHIATRIC NEGATIVE: 1
HEADACHES: 1
MUSCULOSKELETAL NEGATIVE: 1
CARDIOVASCULAR NEGATIVE: 1

## 2024-10-15 NOTE — ASSESSMENT & PLAN NOTE
Stable.  Currently taking omeprazole 20 mg.  Recommend adding probiotic.    If wanting to wean from omeprazole, can try alternating with Pepcid OTC and slowly lower in 2 week increments.

## 2024-10-15 NOTE — PROGRESS NOTES
Subjective   Patient ID: Lashonda Bowie is a 44 y.o. female who presents for annual physical/wellness visit.    She signed document informing that if problem issues also address at today's wellness visit that insurance may be appropriately billed so co-pay and deductible out of pocket expenses may occur.    Colorectal cancer screen: never done  Mammogram: 9/23/2022  Tdap: 11/25/2023 - due now  HIV screen: order placed  Hepatitis C screen: order placed  Hepatitis B vaccine: never done    She was recently under treatment for parvo in June. She was also checked for Lyme disease, this was most likely a false positive. She experienced bad muscle cramps. It was discovered that he B12 level was extremely low and she did begin receiving injections, her most recent blood work showed a big improvement.   She has been experiencing headaches. At first she believed it was due to a pinched nerve in her neck which she was doing physical therapy for. She has ilana taking 600 mg of Ibuprofen once or twice per day. She will have a least one headache daily, she can feel it in her eyes and temples. She reports a throbbing pain. She does not wear glasses when sitting at her computer for work but looking at the screen to long does irritate symptoms.   She does not understand what is causing her weight gain. She reports abnormal menstrual cycles, she may skip a month every couple. She is on oral birth control, she already had her annual appointment this year and will not see her doctor again until late next year. She has tried Weight Watchers in the past but due to ulcerative colitis, she can not tolerate a majority of the foods. She has not tried working with a nutritionist.   She visits with an eye doctor annually. It has been a while since she has seen a dentist, she is looking for a new one now.         Review of Systems   Constitutional:  Positive for unexpected weight change.   HENT: Negative.     Respiratory: Negative.    "  Cardiovascular: Negative.    Gastrointestinal: Negative.    Genitourinary:  Positive for menstrual problem (occasional skipped cycle).   Musculoskeletal: Negative.    Neurological:  Positive for headaches.   Psychiatric/Behavioral: Negative.         Objective   /77 (BP Location: Left arm, Patient Position: Sitting, BP Cuff Size: Adult)   Pulse 83   Temp 37.4 °C (99.3 °F)   Resp 14   Ht 1.651 m (5' 5\")   Wt 76.1 kg (167 lb 11.2 oz)   LMP 10/08/2024 (Exact Date)   SpO2 98%   BMI 27.91 kg/m²     Physical Exam  Constitutional:       Appearance: Normal appearance. She is overweight.   HENT:      Head: Normocephalic.      Right Ear: Tympanic membrane normal.      Left Ear: Tympanic membrane normal.      Nose: Nose normal.      Mouth/Throat:      Pharynx: Oropharynx is clear.   Eyes:      Conjunctiva/sclera: Conjunctivae normal.   Cardiovascular:      Rate and Rhythm: Normal rate and regular rhythm.      Pulses: Normal pulses.      Heart sounds: Normal heart sounds.   Pulmonary:      Effort: Pulmonary effort is normal.      Breath sounds: Normal breath sounds.   Abdominal:      General: Abdomen is flat. Bowel sounds are normal.      Palpations: Abdomen is soft.      Tenderness: There is abdominal tenderness (over bladder).   Musculoskeletal:      Cervical back: Normal range of motion.   Neurological:      General: No focal deficit present.      Mental Status: She is alert.   Psychiatric:         Mood and Affect: Mood normal.         Behavior: Behavior normal.         Thought Content: Thought content normal.         Judgment: Judgment normal.         Assessment/Plan   Problem List Items Addressed This Visit       Gastro-esophageal reflux disease without esophagitis     Stable.  Currently taking omeprazole 20 mg.  Recommend adding probiotic.    If wanting to wean from omeprazole, can try alternating with Pepcid OTC and slowly lower in 2 week increments.          Ulcerative colitis     Stable  GI manages  On " "azulfadine  Given weight gain and challenges with foods to avoid UC triggers, recommend nutritionist - referral given         Relevant Orders    Referral to Nutrition Services    Vitamin D deficiency     Stable.  10/4/24 - 31.         B12 deficiency - Primary     Stable.  10/4/24 - 416.  Recheck level in 3 months         Relevant Medications    cyanocobalamin (Vitamin B-12) injection 1,000 mcg (Start on 10/25/2024  9:00 AM)    Other Relevant Orders    Vitamin B12    Wellness examination    Chronic daily headache     To help prevent frequent headaches:;  Vitamin B2 400mg daily;  Magnesium 500mg daily;  Alpha Lipoic Acid 100mg tadeo;  ;  NO artificial sweeteners;  ;  Daily aerobic exercise for 30 minutes to help with both your physical and mental/emotional health.  ;  Take time twice a day to relax with focused breathing and relaxation.  A good source to learn \"mindfulness\" relaxation is a book \"Mindfulness for Beginners\" by Micho[employer]ano Calix.  ;  Strive for healthy eating with plenty of water, fruits, vegetables, and lean meat sources.;  Avoid processed foods.  Shop the perimeter of the grocery store (the produce, meat, dairy, cheese areas!). Avoid sugar - including fruit juices with added sugar and avoid artificial sweeteners (sucralose, aspartame).;         Weight gain       Ideal weight is BMI 25 or under.  Think of healthy lifestyle changes rather than a diet  Weight loss is 75% diet and 25% exercise   Think of daily plate that includes 50% vegetables and do not count peas, corn, white potatoes as a vegetable. 25% complex grains/starch, 25% protein/fat.  Ideal diet is predominately plant based - Vegetables and Fruit. Protein from non meat sources ideal (whole beans, chickpeas). If choosing meat source for protein - first choice is fish in omega-3 such as [place]. Next choice is skinless poultry and last choice and infrequent should be red meat.  Weight loss can be helped through mindful eating. There are apps that " "can be used to assist with keeping track of your food water and exercise, such as My fitness pal Can see nutritionist if preferred.   Losing 4-6 lbs a month is a sufficient means of gradually losing and maintaining weight loss (good healthy, long term weight loss).      Refer nutritionist         Relevant Orders    Referral to Nutrition Services     Follow up: Scheduled 10/9/2025    Tips for your general wellness...;  Remember importance of daily aerobic exercise for 30minutes to help with both your physical and mental/emotional health.  ;  Take time twice a day to relax with focused breathing and relaxation.  A good source to learn \"mindfulness\" relaxation is a book \"Mindfulness for Beginners\" by [employer].  ;    Strive for healthy eating with plenty of water, fruits, vegetables, and choosing as much plant based diet as able  If do consume non plant protein, choose fish high in omega (like salmon or cod), skinless poultry, and rarely anything from a cow  Avoid processed foods.  ;  Shop the perimeter of the grocery store  - not the inner aisles where all the boxed, bagged, and canned process non natural foods are   Avoid sugar - including fruit juices with added sugar and avoid artificial sweeteners (sucralose, aspartame).; if want to use sweetener, use stevia (natural plant based non caloric sweetener)  Recommended guided nutrition plans include Mediterranean Diet - online resources available     Get plenty of sleep nightly - 7 hours minimum;    Exercise 150min per week;    Do not use tobacco    Abstain or limit alcohol to 1-2 drinks per 24 hours    See your dentist at least yearly    Have an eye check at least every 5 years    Follow up 1 year for annual wellness checkup;    Scribe Attestation  By signing my name below, IAmy Scribe   attest that this documentation has been prepared under the direction and in the presence of Myke Osborne MD.  "

## 2024-10-15 NOTE — ASSESSMENT & PLAN NOTE
"To help prevent frequent headaches:;  Vitamin B2 400mg daily;  Magnesium 500mg daily;  Alpha Lipoic Acid 100mg tadeo;  ;  NO artificial sweeteners;  ;  Daily aerobic exercise for 30 minutes to help with both your physical and mental/emotional health.  ;  Take time twice a day to relax with focused breathing and relaxation.  A good source to learn \"mindfulness\" relaxation is a book \"Mindfulness for Beginners\" by [employer] Calix.  ;  Strive for healthy eating with plenty of water, fruits, vegetables, and lean meat sources.;  Avoid processed foods.  Shop the perimeter of the grocery store (the produce, meat, dairy, cheese areas!). Avoid sugar - including fruit juices with added sugar and avoid artificial sweeteners (sucralose, aspartame).;  "

## 2024-10-15 NOTE — ASSESSMENT & PLAN NOTE
Stable  GI manages  On azulfadine  Given weight gain and challenges with foods to avoid UC triggers, recommend nutritionist - referral given

## 2024-10-15 NOTE — PATIENT INSTRUCTIONS
"To help prevent frequent headaches:;    Vitamin B2 400mg daily;    Magnesium 500mg daily;    Alpha Lipoic Acid 100mg tadeo;    ;    NO artificial sweeteners;    ;    Daily aerobic exercise for 30 minutes to help with both your physical and mental/emotional health.  ;    Take time twice a day to relax with focused breathing and relaxation.  A good source to learn \"mindfulness\" relaxation is a book \"Mindfulness for Beginners\" by [employer].  ;    Strive for healthy eating with plenty of water, fruits, vegetables, and lean meat sources.;    Avoid processed foods.  Shop the perimeter of the grocery store (the produce, meat, dairy, cheese areas!). Avoid sugar - including fruit juices with added sugar and avoid artificial sweeteners (sucralose, aspartame).  "

## 2024-10-15 NOTE — ASSESSMENT & PLAN NOTE
Ideal weight is BMI 25 or under.  Think of healthy lifestyle changes rather than a diet  Weight loss is 75% diet and 25% exercise   Think of daily plate that includes 50% vegetables and do not count peas, corn, white potatoes as a vegetable. 25% complex grains/starch, 25% protein/fat.  Ideal diet is predominately plant based - Vegetables and Fruit. Protein from non meat sources ideal (whole beans, chickpeas). If choosing meat source for protein - first choice is fish in omega-3 such as [place]. Next choice is skinless poultry and last choice and infrequent should be red meat.  Weight loss can be helped through mindful eating. There are apps that can be used to assist with keeping track of your food water and exercise, such as My fitness pal Can see nutritionist if preferred.   Losing 4-6 lbs a month is a sufficient means of gradually losing and maintaining weight loss (good healthy, long term weight loss).      Refer nutritionist

## 2024-10-18 ENCOUNTER — PATIENT OUTREACH (OUTPATIENT)
Dept: CARE COORDINATION | Facility: CLINIC | Age: 44
End: 2024-10-18
Payer: COMMERCIAL

## 2024-10-18 NOTE — PROGRESS NOTES
Called Lashonda in regard to the nutrition referral and LVM with contact number [phone] if she would like to call back and schedule an appointment.

## 2024-10-23 ENCOUNTER — PATIENT OUTREACH (OUTPATIENT)
Dept: CARE COORDINATION | Facility: CLINIC | Age: 44
End: 2024-10-23
Payer: COMMERCIAL

## 2024-10-23 NOTE — PROGRESS NOTES
Outreach call to Lashonda regarding nutrition referral. LYNNETTE with contact number [phone] if she would like to call and schedule a visit.

## 2024-10-25 ENCOUNTER — DOCUMENTATION (OUTPATIENT)
Dept: CARE COORDINATION | Facility: CLINIC | Age: 44
End: 2024-10-25

## 2024-10-25 ENCOUNTER — APPOINTMENT (OUTPATIENT)
Dept: PRIMARY CARE | Facility: CLINIC | Age: 44
End: 2024-10-25
Payer: COMMERCIAL

## 2024-10-25 DIAGNOSIS — E53.8 B12 DEFICIENCY: ICD-10-CM

## 2024-10-25 PROCEDURE — 96372 THER/PROPH/DIAG INJ SC/IM: CPT | Performed by: FAMILY MEDICINE

## 2024-11-22 ENCOUNTER — APPOINTMENT (OUTPATIENT)
Dept: PRIMARY CARE | Facility: CLINIC | Age: 44
End: 2024-11-22
Payer: COMMERCIAL

## 2024-11-26 ENCOUNTER — APPOINTMENT (OUTPATIENT)
Dept: PRIMARY CARE | Facility: CLINIC | Age: 44
End: 2024-11-26
Payer: COMMERCIAL

## 2024-11-26 DIAGNOSIS — E53.8 B12 DEFICIENCY: ICD-10-CM

## 2024-11-26 PROCEDURE — 96372 THER/PROPH/DIAG INJ SC/IM: CPT | Performed by: FAMILY MEDICINE

## 2024-12-20 ENCOUNTER — APPOINTMENT (OUTPATIENT)
Dept: PRIMARY CARE | Facility: CLINIC | Age: 44
End: 2024-12-20
Payer: COMMERCIAL

## 2024-12-20 DIAGNOSIS — E53.8 B12 DEFICIENCY: ICD-10-CM

## 2024-12-20 PROCEDURE — 96372 THER/PROPH/DIAG INJ SC/IM: CPT | Performed by: FAMILY MEDICINE

## 2025-01-03 ENCOUNTER — LAB (OUTPATIENT)
Dept: LAB | Facility: LAB | Age: 45
End: 2025-01-03
Payer: COMMERCIAL

## 2025-01-03 ENCOUNTER — OFFICE VISIT (OUTPATIENT)
Dept: PRIMARY CARE | Facility: CLINIC | Age: 45
End: 2025-01-03
Payer: COMMERCIAL

## 2025-01-03 VITALS
SYSTOLIC BLOOD PRESSURE: 108 MMHG | HEART RATE: 85 BPM | RESPIRATION RATE: 12 BRPM | WEIGHT: 171.9 LBS | DIASTOLIC BLOOD PRESSURE: 75 MMHG | OXYGEN SATURATION: 98 % | BODY MASS INDEX: 28.61 KG/M2 | TEMPERATURE: 98.3 F

## 2025-01-03 DIAGNOSIS — R51.9 CHRONIC NONINTRACTABLE HEADACHE, UNSPECIFIED HEADACHE TYPE: ICD-10-CM

## 2025-01-03 DIAGNOSIS — R07.0 THROAT PAIN: ICD-10-CM

## 2025-01-03 DIAGNOSIS — R53.83 OTHER FATIGUE: ICD-10-CM

## 2025-01-03 DIAGNOSIS — R07.0 THROAT PAIN: Primary | ICD-10-CM

## 2025-01-03 DIAGNOSIS — G89.29 CHRONIC NONINTRACTABLE HEADACHE, UNSPECIFIED HEADACHE TYPE: ICD-10-CM

## 2025-01-03 DIAGNOSIS — E53.8 B12 DEFICIENCY: ICD-10-CM

## 2025-01-03 PROCEDURE — 1036F TOBACCO NON-USER: CPT | Performed by: FAMILY MEDICINE

## 2025-01-03 PROCEDURE — 80053 COMPREHEN METABOLIC PANEL: CPT

## 2025-01-03 PROCEDURE — 86663 EPSTEIN-BARR ANTIBODY: CPT

## 2025-01-03 PROCEDURE — 86665 EPSTEIN-BARR CAPSID VCA: CPT

## 2025-01-03 PROCEDURE — 82607 VITAMIN B-12: CPT

## 2025-01-03 PROCEDURE — 99214 OFFICE O/P EST MOD 30 MIN: CPT | Performed by: FAMILY MEDICINE

## 2025-01-03 PROCEDURE — 86645 CMV ANTIBODY IGM: CPT

## 2025-01-03 PROCEDURE — 86644 CMV ANTIBODY: CPT

## 2025-01-03 PROCEDURE — 85025 COMPLETE CBC W/AUTO DIFF WBC: CPT

## 2025-01-03 PROCEDURE — 87081 CULTURE SCREEN ONLY: CPT

## 2025-01-03 PROCEDURE — 36415 COLL VENOUS BLD VENIPUNCTURE: CPT

## 2025-01-03 PROCEDURE — 84443 ASSAY THYROID STIM HORMONE: CPT

## 2025-01-03 PROCEDURE — 86664 EPSTEIN-BARR NUCLEAR ANTIGEN: CPT

## 2025-01-03 NOTE — PROGRESS NOTES
Subjective   Patient ID: Lashonda Bowie is a 44 y.o. female who presents for Sore Throat (Pt presents for sore throat for over 1 mth and drainage, headache. Pt's daughter had walking pneumonia. Pt took Ibuprofen and allergy med./Pt had flu vaccine.).  HPI  Left sided sore throat,  headaches (chronic) , drng in throat, on the left.  Hx of allergies  and On Allegra,  stopped it to see if that was contributing.     Exp to pneumonia ; child;   antib .finished 12/25      Very tired .   Heaviness in chest  No fevers .   Sweats recently on/ off  .  Irreg menses.  No dysuria .  No diarrhea/ constipation . Colitis stable .  No joint swelling, rash  .  Sleeping - same / adequate     Pmhx   Ulcerative colitis . Stable/ quiet   GERD -  on Prevacid 4 days a week,  3 days a week .   typical sxs was ' food getting stuck'  . Not having those sxs anymore.   B12 Deficiency     Review of Systems  No dental pain / dental work .   Hx of TMJ at times .   Hx of pinched nerve in neck . Treated with PT, ibuprofen, over this past summer.    Fatigue - sleepinguninterrupted, but not feeling rested.      Nonsmoker. No etoh .  No stressors  .   Not currently on Ibuprofen 800 mg.     Objective   /75 (BP Location: Right arm, Patient Position: Sitting, BP Cuff Size: Adult)   Pulse 85   Temp 36.8 °C (98.3 °F) (Temporal)   Resp 12   Wt 78 kg (171 lb 14.4 oz)   LMP 12/30/2024 (Exact Date)   SpO2 98%   BMI 28.61 kg/m²     Physical Exam  Vitals and nursing note reviewed.   Constitutional:       General: She is not in acute distress.     Appearance: Normal appearance.   HENT:      Head: Normocephalic and atraumatic.      Right Ear: Tympanic membrane normal.      Left Ear: Tympanic membrane normal.      Mouth/Throat:      Mouth: Mucous membranes are moist.      Pharynx: Oropharynx is clear. No oropharyngeal exudate or posterior oropharyngeal erythema.   Eyes:      Conjunctiva/sclera: Conjunctivae normal.      Pupils: Pupils are equal, round,  and reactive to light.   Neck:      Comments: Anterior cervical LN mild increase in size and tender on the left.    Posterior inferior cervical LN are palpable. But not tender.   No thyroid tenderness .     Cardiovascular:      Rate and Rhythm: Normal rate and regular rhythm.      Heart sounds: Normal heart sounds.   Pulmonary:      Breath sounds: Normal breath sounds.   Musculoskeletal:         General: Normal range of motion.      Cervical back: Neck supple.   Skin:     General: Skin is warm and dry.      Coloration: Skin is not jaundiced.      Findings: No rash.   Neurological:      General: No focal deficit present.      Mental Status: She is alert and oriented to person, place, and time.         Assessment/Plan   Problem List Items Addressed This Visit    None  Visit Diagnoses       Throat pain    -  Primary    Relevant Orders    Group A Streptococcus, Culture    CBC and Auto Differential    Comprehensive Metabolic Panel    TSH with reflex to Free T4 if abnormal    J Luis-Barr Virus Antibody Panel (VCA IgG/IgM, EA IgG, NA IgG)    CMV IgG, IgM    Other fatigue        Relevant Orders    CBC and Auto Differential    Comprehensive Metabolic Panel    TSH with reflex to Free T4 if abnormal    J Luis-Barr Virus Antibody Panel (VCA IgG/IgM, EA IgG, NA IgG)    CMV IgG, IgM    Chronic nonintractable headache, unspecified headache type                 Throat pain and fatigue  - viral infx , consider possibly EBV/ CMV  - possible Strep , chronic carrier  ,  we do not have rapid test available, culture obtained.     Fatigue    -thyroid, lytes       Hx of GERD. I think it's possible her sxs are gerd related.  Recommend continuing gerd tx, keep diet bland.      Etiology of throat pain not clear ,  no specific tx prescribed.   Will followup pending results,  low threshold to refer.       SIMONE Adame MD

## 2025-01-04 LAB
ALBUMIN SERPL BCP-MCNC: 4.3 G/DL (ref 3.4–5)
ALP SERPL-CCNC: 75 U/L (ref 33–110)
ALT SERPL W P-5'-P-CCNC: 12 U/L (ref 7–45)
ANION GAP SERPL CALC-SCNC: 11 MMOL/L (ref 10–20)
AST SERPL W P-5'-P-CCNC: 14 U/L (ref 9–39)
BASOPHILS # BLD AUTO: 0.05 X10*3/UL (ref 0–0.1)
BASOPHILS NFR BLD AUTO: 0.9 %
BILIRUB SERPL-MCNC: 0.3 MG/DL (ref 0–1.2)
BUN SERPL-MCNC: 8 MG/DL (ref 6–23)
CALCIUM SERPL-MCNC: 9.3 MG/DL (ref 8.6–10.6)
CHLORIDE SERPL-SCNC: 104 MMOL/L (ref 98–107)
CMV IGG AVIDITY SERPL IA-RTO: REACTIVE %
CO2 SERPL-SCNC: 29 MMOL/L (ref 21–32)
CREAT SERPL-MCNC: 0.6 MG/DL (ref 0.5–1.05)
EBV EA IGG SER QL: NEGATIVE
EBV NA AB SER QL: POSITIVE
EBV VCA IGG SER IA-ACNC: POSITIVE
EBV VCA IGM SER IA-ACNC: NEGATIVE
EGFRCR SERPLBLD CKD-EPI 2021: >90 ML/MIN/1.73M*2
EOSINOPHIL # BLD AUTO: 0.07 X10*3/UL (ref 0–0.7)
EOSINOPHIL NFR BLD AUTO: 1.2 %
ERYTHROCYTE [DISTWIDTH] IN BLOOD BY AUTOMATED COUNT: 12 % (ref 11.5–14.5)
GLUCOSE SERPL-MCNC: 77 MG/DL (ref 74–99)
HCT VFR BLD AUTO: 40.8 % (ref 36–46)
HGB BLD-MCNC: 13.4 G/DL (ref 12–16)
IMM GRANULOCYTES # BLD AUTO: 0.03 X10*3/UL (ref 0–0.7)
IMM GRANULOCYTES NFR BLD AUTO: 0.5 % (ref 0–0.9)
LYMPHOCYTES # BLD AUTO: 2.05 X10*3/UL (ref 1.2–4.8)
LYMPHOCYTES NFR BLD AUTO: 36.4 %
MCH RBC QN AUTO: 28.3 PG (ref 26–34)
MCHC RBC AUTO-ENTMCNC: 32.8 G/DL (ref 32–36)
MCV RBC AUTO: 86 FL (ref 80–100)
MONOCYTES # BLD AUTO: 0.56 X10*3/UL (ref 0.1–1)
MONOCYTES NFR BLD AUTO: 9.9 %
NEUTROPHILS # BLD AUTO: 2.87 X10*3/UL (ref 1.2–7.7)
NEUTROPHILS NFR BLD AUTO: 51.1 %
NRBC BLD-RTO: 0 /100 WBCS (ref 0–0)
PLATELET # BLD AUTO: 312 X10*3/UL (ref 150–450)
POTASSIUM SERPL-SCNC: 4.4 MMOL/L (ref 3.5–5.3)
PROT SERPL-MCNC: 7.1 G/DL (ref 6.4–8.2)
RBC # BLD AUTO: 4.74 X10*6/UL (ref 4–5.2)
SODIUM SERPL-SCNC: 140 MMOL/L (ref 136–145)
TSH SERPL-ACNC: 3.27 MIU/L (ref 0.44–3.98)
VIT B12 SERPL-MCNC: 380 PG/ML (ref 211–911)
WBC # BLD AUTO: 5.6 X10*3/UL (ref 4.4–11.3)

## 2025-01-05 LAB
CMV IGM SERPL-ACNC: 10.7 AU/ML
S PYO THROAT QL CULT: NORMAL

## 2025-01-06 ENCOUNTER — PATIENT MESSAGE (OUTPATIENT)
Dept: PRIMARY CARE | Facility: CLINIC | Age: 45
End: 2025-01-06
Payer: COMMERCIAL

## 2025-01-06 DIAGNOSIS — J31.2 CHRONIC SORE THROAT: Primary | ICD-10-CM

## 2025-01-13 NOTE — PROGRESS NOTES
Subjective        Lashonda Bowie is a 44 y.o. female who presents for      HPI:          Chief Complaint   Patient presents with    Vertigo     What concern/ problem/pain/symptom  brings you here today?  vertigo    how long has pt had sxs?  Since Friday    describe symptoms-  vertigo    Fever- no  Cough- no  Nausea- yes  Vomiting- no  Blood in urine- no  Blood in stool- no  Pain- no  No head injury or fall     how often do symptoms occur?  Constant, but is worse in the morning and in the evening    has pt tried anything for current symptoms, including medications (OTC or prescription)  ?    Neck tilting, ibuprofen, dramamine, pressure point exercise thing    what makes symptoms worse?  N/a    has pt been seen recently for this problem ( within past 2-3 weeks) ?  No    if yes- where?  N/a    by who?  N/a    what treatment was provided?     N/a       1/3/35 - saw Dr Azar WAYNE and fatigue  Ordered labs 1/3/25    Message in chart:    Kimberly Adame MD   Physician  Specialty: Family Medicine     Patient Communication      Signed     Creation Time: 1/6/2025  3:12 PM     Signed         Called and spoke to pt ,  per her request.     Sxs unchanged.  We reviewed labwork .  CMV , EBV PAST exposures.    Nothing in the labs to explain chronic sore throat.  Recommend second opinion with ENT, referral entered.   1/30/25  is seeing Gastroenterology .      Between now and GI appt  -  she will stop otc Airborne, as it has been making her stomach upset. Will monitor throat sxs .  We can change her GERD tx (current is Omeprazole alternating with Prevacid 20 mg ). Asked pt to clarify dose of Omeprazole. She is unsure, but can check at home and email back .      Options for GERD tx  Nexium, or Protonix, or increased strength of Omeprazole if current dose is the low dose 20 mg.                      Electronically signed by Kimberly Adame MD at 1/6/2025  3:34 PM    History neck pain - was going to PT     ST has resoled       Social History      Tobacco Use   Smoking Status Never    Passive exposure: Never   Smokeless Tobacco Never         Social History     Substance and Sexual Activity   Alcohol Use Yes    Comment: Socially          Review of Systems:    Review of Systems    Objective        There were no vitals filed for this visit.    Pt is A and O x3, NAD, normal gait, climbs on and off exam table without difficulty   Head- normocephalic and atraumatic,   EYES- conjunctiva- normal   lids- normal  PERRLA, EOMI  EARS/NOSE- TM's normal, nasopharynx- normal and atraumatic  OROPHARYNX- normal  Normal speech, talks easily  NECK- supple, FROM  THYROID- NT, normal size, no nodule noted  LYMPH- no cervical lymph nodes palpated   CV- RRR without murmur  PULM- CTA bilaterally, normal respiratory effort  RESPIRATORY EFFORT- normal , no retractions or nasal flaring   ABD- normoactive BS's , soft , NT, no hepatosplenomegaly palpated  EXT- no edema,NT  SKIN- no abnormal skin lesions noted  NEURO- no focal deficits  CN 2-12 intact  PSYCH- pleasant, normal judgement and insight                  BP Readings from Last 3 Encounters:   01/03/25 108/75   10/15/24 116/77   09/03/24 129/80           Wt Readings from Last 3 Encounters:   01/03/25 78 kg (171 lb 14.4 oz)   10/15/24 76.1 kg (167 lb 11.2 oz)   09/03/24 75.8 kg (167 lb)         BMI Readings from Last 3 Encounters:   01/03/25 28.61 kg/m²   10/15/24 27.91 kg/m²   09/03/24 28.22 kg/m²           Assessment & Plan  Vertigo    Orders:    Referral to Physical Therapy; Future    meclizine (Antivert) 25 mg tablet; Take 1 tablet (25 mg) by mouth 3 times a day as needed for dizziness for up to 30 doses.             Refer to PT      Use antivert as directed             Call if no better or if symptoms worsen

## 2025-01-14 ENCOUNTER — OFFICE VISIT (OUTPATIENT)
Dept: PRIMARY CARE | Facility: CLINIC | Age: 45
End: 2025-01-14
Payer: COMMERCIAL

## 2025-01-14 ENCOUNTER — TELEPHONE (OUTPATIENT)
Dept: PRIMARY CARE | Facility: CLINIC | Age: 45
End: 2025-01-14

## 2025-01-14 VITALS
DIASTOLIC BLOOD PRESSURE: 72 MMHG | HEART RATE: 92 BPM | OXYGEN SATURATION: 97 % | TEMPERATURE: 98.4 F | SYSTOLIC BLOOD PRESSURE: 114 MMHG | BODY MASS INDEX: 28.36 KG/M2 | WEIGHT: 170.4 LBS

## 2025-01-14 DIAGNOSIS — R42 VERTIGO: Primary | ICD-10-CM

## 2025-01-14 DIAGNOSIS — E53.8 B12 DEFICIENCY: Primary | ICD-10-CM

## 2025-01-14 PROCEDURE — 99214 OFFICE O/P EST MOD 30 MIN: CPT | Performed by: FAMILY MEDICINE

## 2025-01-14 PROCEDURE — 1036F TOBACCO NON-USER: CPT | Performed by: FAMILY MEDICINE

## 2025-01-14 RX ORDER — CYANOCOBALAMIN 1000 UG/ML
1000 INJECTION, SOLUTION INTRAMUSCULAR; SUBCUTANEOUS
Status: SHIPPED | OUTPATIENT
Start: 2025-01-15 | End: 2025-10-12

## 2025-01-14 RX ORDER — MECLIZINE HYDROCHLORIDE 25 MG/1
25 TABLET ORAL 3 TIMES DAILY PRN
Qty: 30 TABLET | Refills: 0 | Status: SHIPPED | OUTPATIENT
Start: 2025-01-14

## 2025-01-14 NOTE — TELEPHONE ENCOUNTER
Patient came in wanting to schedule her B-12 shots there is only one order in there.  Does she need to keep coming in to get the shot if so can you put in the order for her so that I can schedule .

## 2025-01-15 NOTE — PROGRESS NOTES
Subjective   Patient ID: Lashonda Bowie is a 44 y.o. female who presents for No chief complaint on file..  HPI  This 44-year-old female last seen in the office in September 2022 is being seen in follow-up on recurrent sore throats.  Her previous visit centered primarily on chronic eczematous otitis externa.  She was noted however to have nasal septal deviation at that time with a normal hearing exam.  She was seen by primary care and January 3 of this year for sore throats states that she had had symptoms for over a month at that time with drainage and also with complaints regarding headaches.  Laboratory test done to evaluate this revealed elevated cytomegaly IgG indicating previous infection, there is also elevated J Luis-Jordan nuclear antigen and VCA IgG antigen positive indicating previous infection,  normal TSH normal comprehensive metabolic panel normal B12 level streptococcal culture was normal as well.  According to messages in her chart she seemed to be getting some improvement by the use of PPI therapy against reflux.    Review of Systems    Objective   Physical Exam  Examination:    CONSTITUTIONAL: Alert, in no acute distress, normal pitch/clarity of voice, well-developed, well-nourished, cooperative.  HEAD/FACE: Normocephalic, atraumatic, no tenderness over the sinuses, facial strength and movement symmetric.    SKIN: Good turgor, no rashes, no suspicious lesions, in the head and neck.    EYES: Both eyes have normal extraocular movements with no nystagmus, pupils are equal and reactive to light and accommodation, conjunctiva is clear.    EARS: Both ears are negative for external skin abnormalities, external auditory canals are without lesions or signs of inflammation, tympanic membranes are intact and are of normal color and texture, no effusions are seen, light reflexes normal, no mastoid tenderness is noted to palpation, objective hearing is intact.    NOSE: No external skin lesions are noted, nares are  patent, septum is intact and noninflamed, nasal turbinates are normal in appearance, sinuses are nontender to palpation bilaterally, no internal lesions or polyps are noted, no discharge is noted.    OROPHARYNX/ORAL CAVITY: Mucous membranes of the oropharynx and the oral cavity proper are without lesions or ulcerations, tongue mobility is normal and no lesions are noted, gingiva and alveolar mucosa is intact without lesions, oral mucosa is moist, muscular movement of the palate and gag reflex are normal.    NASOPHARYNX: Mucous membranes are noninflamed and no secretions or lesions are noted.    LARYNX: No mucosal inflammation or exudates are noted, arytenoids are normal in appearance and mobility, false vocal cords are without lesions as is the remainder of the supraglottic larynx, true vocal folds are mobile without inflammation or obstructions and no masses or lesions are noted in the endolarynx.    NECK: No lymphadenopathy is palpated, neck is supple with full range of motion, thyroid is without swelling or tenderness, trachea is midline, no neck masses are noted.    Lymphatics: No cervical adenopathy or supraclavicular adenopathy noted to palpation.    HEART/VASCULAR: No jugular venous distention is noted, carotid pulsations are intact with a regular rate and rhythm noted,    PULMONARY: Good air movement with normal inspiratory/expiratory effort is noted, no audible wheezing is appreciated.    NEUROLOGIC: Alert and oriented, cranial nerves are grossly intact, gait is normal, sensation in the head and neck is intact,    PSYCH: oriented to person, place and time, normal mood and affect.    EXTREMITIES: No motor dysfunction of the upper and lower extremity is noted.  Assessment/Plan   {Assess/PlanSmartLinks:00099}         Jesus Burns DMD, MD 01/15/25 8:41 AM
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
## 2025-01-16 ENCOUNTER — CLINICAL SUPPORT (OUTPATIENT)
Dept: AUDIOLOGY | Facility: CLINIC | Age: 45
End: 2025-01-16
Payer: COMMERCIAL

## 2025-01-16 ENCOUNTER — OFFICE VISIT (OUTPATIENT)
Dept: OTOLARYNGOLOGY | Facility: CLINIC | Age: 45
End: 2025-01-16
Payer: COMMERCIAL

## 2025-01-16 ENCOUNTER — APPOINTMENT (OUTPATIENT)
Dept: OTOLARYNGOLOGY | Facility: CLINIC | Age: 45
End: 2025-01-16
Payer: COMMERCIAL

## 2025-01-16 VITALS — WEIGHT: 170 LBS | HEIGHT: 65 IN | BODY MASS INDEX: 28.32 KG/M2

## 2025-01-16 DIAGNOSIS — R42 DIZZINESS: Primary | ICD-10-CM

## 2025-01-16 DIAGNOSIS — R26.81 GAIT INSTABILITY: ICD-10-CM

## 2025-01-16 DIAGNOSIS — R42 CERVICAL VERTIGO: ICD-10-CM

## 2025-01-16 DIAGNOSIS — H81.399 PERIPHERAL VERTIGO, UNSPECIFIED LATERALITY: ICD-10-CM

## 2025-01-16 DIAGNOSIS — R42 DIZZINESS AND GIDDINESS: Primary | ICD-10-CM

## 2025-01-16 PROCEDURE — 1036F TOBACCO NON-USER: CPT | Performed by: OTOLARYNGOLOGY

## 2025-01-16 PROCEDURE — 92567 TYMPANOMETRY: CPT | Performed by: AUDIOLOGIST

## 2025-01-16 PROCEDURE — 3008F BODY MASS INDEX DOCD: CPT | Performed by: OTOLARYNGOLOGY

## 2025-01-16 PROCEDURE — 99214 OFFICE O/P EST MOD 30 MIN: CPT | Performed by: OTOLARYNGOLOGY

## 2025-01-16 PROCEDURE — 92557 COMPREHENSIVE HEARING TEST: CPT | Performed by: AUDIOLOGIST

## 2025-01-16 RX ORDER — PREDNISONE 10 MG/1
TABLET ORAL
Qty: 18 TABLET | Refills: 0 | Status: SHIPPED | OUTPATIENT
Start: 2025-01-16 | End: 2025-01-26

## 2025-01-16 NOTE — PROGRESS NOTES
[facility] AUDIOLOG[facility]  AUDIOMETRIC EVALUATION      Name:  Lashonda Bowie   :  1980  Age:  44 y.o.  Date of Evaluation:  25    HISTORY    Lashonda Bowie is seen today at the request of Jesus Burns M.D., ZAC., F.A.C.S.  The patient is  an established patient complaining of dizziness.    EVALUATION  See scanned audiogram in Media and included at the end of this report.    RESULTS    Otoscopic Evaluation:  Right Ear:  clear   Left Ear:  clear    Tympanometry:   Right Ear:  Type A, consistent with normal eardrum mobility and middle ear pressure   Left Ear:  Type A, consistent with normal eardrum mobility and middle ear pressure    Acoustic reflexes were not completed    Pure Tone Audiometry:    Right Ear:  hearing sensitivity was within normal limits  Left Ear:  hearing sensitivity was within normal limits    Speech Audiometry:    Right Ear:  excellent in quiet at a normal presentation level  Left Ear:  excellent in quiet at a normal presentation level  Speech reception thresholds were in good agreement with pure tone testing.    DISCUSSION  Results were relayed to Jesus Burns M.D., ZAC., F.A.C.S.    APPOINTMENT TIME  10:30am-11:00am     Trae Russo  Doctor of Audiology  Senior Audiologist

## 2025-01-21 ENCOUNTER — APPOINTMENT (OUTPATIENT)
Dept: PRIMARY CARE | Facility: CLINIC | Age: 45
End: 2025-01-21
Payer: COMMERCIAL

## 2025-01-30 ENCOUNTER — OFFICE (OUTPATIENT)
Dept: URBAN - METROPOLITAN AREA CLINIC 26 | Facility: CLINIC | Age: 45
End: 2025-01-30
Payer: COMMERCIAL

## 2025-01-30 VITALS
HEART RATE: 80 BPM | SYSTOLIC BLOOD PRESSURE: 126 MMHG | WEIGHT: 173 LBS | DIASTOLIC BLOOD PRESSURE: 72 MMHG | HEIGHT: 65 IN | TEMPERATURE: 98.4 F

## 2025-01-30 DIAGNOSIS — K21.9 GASTRO-ESOPHAGEAL REFLUX DISEASE WITHOUT ESOPHAGITIS: ICD-10-CM

## 2025-01-30 DIAGNOSIS — R19.4 CHANGE IN BOWEL HABIT: ICD-10-CM

## 2025-01-30 DIAGNOSIS — K51.90 ULCERATIVE COLITIS, UNSPECIFIED, WITHOUT COMPLICATIONS: ICD-10-CM

## 2025-01-30 DIAGNOSIS — Z83.79 FAMILY HISTORY OF OTHER DISEASES OF THE DIGESTIVE SYSTEM: ICD-10-CM

## 2025-01-30 PROCEDURE — 99214 OFFICE O/P EST MOD 30 MIN: CPT | Performed by: NURSE PRACTITIONER

## 2025-01-30 RX ORDER — FAMOTIDINE 40 MG/1
40 TABLET, FILM COATED ORAL
Qty: 90 | Refills: 1 | Status: ACTIVE
Start: 2025-01-30

## 2025-01-30 RX ORDER — PROMETHAZINE HYDROCHLORIDE 12.5 MG/1
TABLET ORAL
Qty: 3 | Refills: 0 | Status: ACTIVE
Start: 2025-01-30

## 2025-01-31 ENCOUNTER — TELEPHONE (OUTPATIENT)
Dept: PRIMARY CARE | Facility: CLINIC | Age: 45
End: 2025-01-31
Payer: COMMERCIAL

## 2025-01-31 DIAGNOSIS — R42 VERTIGO: Primary | ICD-10-CM

## 2025-01-31 NOTE — TELEPHONE ENCOUNTER
Patient called and needs a referral to Neurology.  Can you please submit.     Also, she was scheduled on 1.21 for her B12 injec but cancelled.  Her next appt is on 2.25.  She asked if this is too long to wait or is she ok with waiting until the next appt date?

## 2025-02-02 NOTE — TELEPHONE ENCOUNTER
Should come as soon as able to get B12 injection - not skip     I need diagnosis/reason for neurology referral?

## 2025-02-03 NOTE — TELEPHONE ENCOUNTER
Pt is requesting neuro referral on the recommendation of ENT and PT as they have both been ineffective in treating her ongoing dizziness/vertigo. Pt was advised that neuro is the next step.    Pt is scheduled on 02/04 for B12 injection.

## 2025-02-04 ENCOUNTER — CLINICAL SUPPORT (OUTPATIENT)
Dept: PRIMARY CARE | Facility: CLINIC | Age: 45
End: 2025-02-04
Payer: COMMERCIAL

## 2025-02-04 PROCEDURE — 96372 THER/PROPH/DIAG INJ SC/IM: CPT | Performed by: FAMILY MEDICINE

## 2025-02-04 RX ADMIN — CYANOCOBALAMIN 1000 MCG: 1000 INJECTION, SOLUTION INTRAMUSCULAR; SUBCUTANEOUS at 09:39

## 2025-02-14 ENCOUNTER — TELEMEDICINE CLINICAL SUPPORT (OUTPATIENT)
Dept: NUTRITION | Facility: CLINIC | Age: 45
End: 2025-02-14
Payer: COMMERCIAL

## 2025-02-14 VITALS — HEIGHT: 65 IN | BODY MASS INDEX: 28.49 KG/M2 | WEIGHT: 171 LBS

## 2025-02-14 DIAGNOSIS — K51.90 ULCERATIVE COLITIS WITHOUT COMPLICATIONS, UNSPECIFIED LOCATION (MULTI): ICD-10-CM

## 2025-02-14 DIAGNOSIS — R63.5 WEIGHT GAIN: ICD-10-CM

## 2025-02-14 PROCEDURE — 97802 MEDICAL NUTRITION INDIV IN: CPT | Mod: 95 | Performed by: DIETITIAN, REGISTERED

## 2025-02-14 PROCEDURE — 97802 MEDICAL NUTRITION INDIV IN: CPT | Performed by: DIETITIAN, REGISTERED

## 2025-02-14 NOTE — PROGRESS NOTES
"Reason for Nutrition Visit:  Pt is a 45 y.o. female being seen remotely. Pt was referred by Myke Osborne MD effective 10/15/24.   1. Ulcerative colitis without complications, unspecified location (Multi)  Referral to Nutrition Services      2. Weight gain  Referral to Nutrition Services           Past Medical Hx:  Patient Active Problem List   Diagnosis    Gastro-esophageal reflux disease without esophagitis    Ulcerative colitis    Vitamin D deficiency    B12 deficiency    Wellness examination    Chronic daily headache    Weight gain          Current Outpatient Medications:     Maria Fe 1.5/30, 28, 1.5 mg-30 mcg (21)/75 mg (7) tablet, TAKE 1 TABLET BY MOUTH EVERY DAY FOR 28 DAYS, Disp: , Rfl:     ibuprofen 800 mg tablet, Take 1 tablet (800 mg) by mouth 3 times a day as needed (pain)., Disp: 20 tablet, Rfl: 0    meclizine (Antivert) 25 mg tablet, Take 1 tablet (25 mg) by mouth 3 times a day as needed for dizziness for up to 30 doses., Disp: 30 tablet, Rfl: 0    omeprazole (PriLOSEC) 20 mg DR capsule, Take 1 capsule (20 mg) by mouth. Do not crush or chew., Disp: , Rfl:     sulfaSALAzine (Azulfidine) 500 mg tablet, Take 3 tablets (1,500 mg) by mouth 2 times a day., Disp: , Rfl:     valACYclovir (Valtrex) 500 mg tablet, 2 TABLETS ORALLY TWICE A DAY , AS NEEDED 5 DAYS, Disp: , Rfl:     Current Facility-Administered Medications:     cyanocobalamin (Vitamin B-12) injection 1,000 mcg, 1,000 mcg, intramuscular, q30 days, Myke Osborne MD, 1,000 mcg at 02/04/25 0939     Anthropometrics:  Anthropometrics  Height: 165.1 cm (5' 5\")  Weight: 77.6 kg (171 lb)  BMI (Calculated): 28.46   Weight change:    Significant Weight Change: No  - 150 lbs   2/2025 Weight: 171 lbs     Lab Results   Component Value Date    CHOL 188 08/30/2024    LDLF 106 (H) 01/03/2023    TRIG 178 (H) 08/30/2024    HDL 48.9 08/30/2024          Chemistry    Lab Results   Component Value Date/Time     01/03/2025 1601    K 4.4 01/03/2025 1601    "  01/03/2025 1601    CO2 29 01/03/2025 1601    BUN 8 01/03/2025 1601    CREATININE 0.60 01/03/2025 1601    Lab Results   Component Value Date/Time    CALCIUM 9.3 01/03/2025 1601    ALKPHOS 75 01/03/2025 1601    AST 14 01/03/2025 1601    ALT 12 01/03/2025 1601    BILITOT 0.3 01/03/2025 1601        Lab Results   Component Value Date    VADRHHRM97 380 01/03/2025      Lab Results   Component Value Date    WBC 5.6 01/03/2025    HGB 13.4 01/03/2025    HCT 40.8 01/03/2025    MCV 86 01/03/2025     01/03/2025      Food and Nutrition Hx:  Pt states she would like to address her IBD. She has gained weight over the course of the years.   Pt has a B12 deficiency. She is receiving injections. She is tired all of the time. She denies parathesisa.   Pt goes to the bathroom 2 times a day. Stools types is between 4 -7 to include mucus. She has abdominal pain a few times a week. She may stiffness in the joints.   Pt states she has dizzy issues. She reports being dizzy spells. Urine is dark in the morning. She can get headaches often.     Pt wakes up at 6:30 am. 3 meals are consumed per day with sometimes a snack.   24 Diet Recall:  Meal 1: Breakfast is at 8:30 -9:00 chocolate croissant from [employer] or egg bites or eggs and Hebrew toast.    Meal 2: Lunch is at 1:00 to include a salad with chicken and dressing or pasta with oil with grilled vegetables or skipped.   Meal 3: Dinner is at 5:00 to include 2 slices of pizza or a fettuccine freeman pasta    Snacks: popcorn and veggie straws   Beverages: Mocha Frappucino with oat milk, 2 cup of herbal tea with sugar, and 1 bottle of water. She also consumes pop 3 times a week.     Eating out: 4 times a week.     Allergies: strawberries  Intolerance: citrus/adults supplement/garlic  Appetite: Normal  Intake: >75%  GI Symptoms : see above    Swallowing Difficulty: No problems with swallowing  Dentition : own    Types of Activities: House/Yard Work    Sleep duration/quality : 5-6  "hours and disrupted sleep  Sleep disorders: none    Supplements: Denies   Pt has intolerance to supplement. She can takes child's supplement.     Energy Levels: Fluctuates    Food preparation: Spouse   Cooking Skills/Barriers: None reported  Grocery Shopping: Patient      Nutrition Focused Physical Exam:    Performed/Deferred: Deferred due to be being virtual visit    Estimated Energy Needs:  Energy Needs  Calculated Energy Needs Using Equations  Height: 165.1 cm (5' 5\")  Weight Used for Equation Calculations: 77.6 kg (171 lb)  [employer] Equation (Overweight or Obese Patients): 1427  Equation Chosen to Use by RD: Gyros  Activity Factor: 1.3  Total Energy Needs: 1855  Estimated Energy Needs  Total Energy Estimated Needs in 24 hours (kCal): 1355 kCal    Nutrition Diagnosis:  Nutrition Diagnosis     Patient has Nutrition Diagnosis Yes   Diagnosis Status (1) New   Nutrition Diagnosis 1 Food and nutrition related knowledge deficit   Related to (1) how to eat for UC   As Evidenced by (1) reports by pt of the need to learn.   Additional Nutrition Diagnosis Diagnosis 2   Diagnosis Status (2) New   Nutrition Diagnosis 2 Altered GI function   Related to (2) limited ability to eat to nourish the body due to GI issues   As Evidenced by (2) reports by pt if IBD symptoms of diarrhea, pain, and other symtpoms.   Diagnosis Status (3) New   Nutrition Diagnosis 3 Intake of types of carbohydrate inconsistent with needs   Related to (3) foods selected are generally not tolerated with IBD flare   As Evidenced by (3) 24 recall shows diet high in sugar and carbohydrates.         Nutrition Interventions:  Medical nutrition therapy was given for weight gain and UC.   Nutrition Counseling: Motivational Interviewing  Coordination of Care: None    Nutrition Recommendations:  1,300 calories for 1 lb weight loss per week.  Heart healthy meal plan with a low saturated fat intake to <5 -6 % of energy (less than 8  grams of " saturated fat per day), reduced intake of added sugars (<10% of total energy), 25 grams of fiber with intake of viscous fiber to 5 g/day to 10 g/day, plant sterols/stanols to 2 g/day, and 1.1 gram of omega three fatty acids to improve lipids. Adequate hydration of ~80 ounces of fluid with use of ORS. Improve GI issues related to IBD. DRI for Iron, Magnesium, Zinc, Calcium, Vitamin D, Vitamin B-12, and Folate    Nutrition Goals:  Via teach back method patient verbalized understanding of the following topics:  1) Aim for smaller meal more throughout the day. Strive to consume breakfast within 1 -2 hours of waking to jump start the metabolism. Aim for breakfast at 8:00, lunch at noon, snack at 4:00 pm, dinner at 6:00 and a bedtime snack at 10:00 pm.  2) Strive to include protein at the meals and even snacks. Strive to select IBD friendly using the handout. Eating protein at meals and snack can help meet protein needs and increase the metabolism by adding protein at all meals .  3) Strive to focus on IBD friendly foods. Start off at breakfast, switch from the croissant to banana with peanut butter or eggs cooked with oil and plain toast.   4) Aim to increase water to at least 64+ ounces per day. Dehydration signs can include dizziness upon standing, dark urine in the morning, and frequent headaches.   5) If having diarrhea, consider making a homemade oral salt solution to help replete electrolytes.     Educational Handouts/Practices: IBD nutrition therapy   Next Session: Meal Planning and Structure     Amanda Bernstein, MS, RDN, LD, SIRISHA, MB-EAT-P Advanced Practice Clinical Dietitian Mindfulness-Based Eating Awareness Training Practitioner [facility] [email or web address] Scheduling Line [phone] Direct Line [phone]    Readiness to Change : Good  Level of Understanding : Good  Anticipated Compliant : Good

## 2025-02-15 ENCOUNTER — OFFICE VISIT (OUTPATIENT)
Dept: URGENT CARE | Age: 45
End: 2025-02-15
Payer: COMMERCIAL

## 2025-02-15 VITALS
OXYGEN SATURATION: 98 % | BODY MASS INDEX: 28.82 KG/M2 | HEART RATE: 110 BPM | SYSTOLIC BLOOD PRESSURE: 130 MMHG | DIASTOLIC BLOOD PRESSURE: 84 MMHG | WEIGHT: 173 LBS | RESPIRATION RATE: 26 BRPM | TEMPERATURE: 98.6 F | HEIGHT: 65 IN

## 2025-02-15 DIAGNOSIS — J06.9 VIRAL URI: Primary | ICD-10-CM

## 2025-02-15 DIAGNOSIS — J02.9 SORE THROAT: ICD-10-CM

## 2025-02-15 DIAGNOSIS — Z91.89 AT INCREASED RISK OF EXPOSURE TO COVID-19 VIRUS: ICD-10-CM

## 2025-02-15 DIAGNOSIS — R09.82 PND (POST-NASAL DRIP): ICD-10-CM

## 2025-02-15 LAB
POC RAPID INFLUENZA A: NEGATIVE
POC RAPID INFLUENZA B: NEGATIVE
POC RAPID STREP: NEGATIVE
POC SARS-COV-2 AG BINAX: NORMAL

## 2025-02-15 RX ORDER — FAMOTIDINE 10 MG/1
10 TABLET ORAL DAILY
COMMUNITY

## 2025-02-15 ASSESSMENT — ENCOUNTER SYMPTOMS
MUSCULOSKELETAL NEGATIVE: 1
NEUROLOGICAL NEGATIVE: 1
FEVER: 0
EYES NEGATIVE: 1
SHORTNESS OF BREATH: 0
WHEEZING: 0
NAUSEA: 0
PSYCHIATRIC NEGATIVE: 1
FATIGUE: 0
COUGH: 0
SINUS PAIN: 0
CARDIOVASCULAR NEGATIVE: 1
VOMITING: 0
RHINORRHEA: 0
SORE THROAT: 1
RESPIRATORY NEGATIVE: 1
SINUS PRESSURE: 1
GASTROINTESTINAL NEGATIVE: 1
CONSTITUTIONAL NEGATIVE: 1

## 2025-02-15 ASSESSMENT — PAIN SCALES - GENERAL: PAINLEVEL_OUTOF10: 5
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
## 2025-02-24 NOTE — PROGRESS NOTES
"Subjective   Patient ID: Lashonda Bowie is a 45 y.o. female who presents for URI (Pt presents for chest/head congestion, mucus, sore voice, bloody nose X13 days./Seen at urgent care 2-15-25, had rapid test for flu, covid, strep - negative. Prescribed Allegra, Flonase./Left shoulder painful and has been to PT for it.).    HPI     She was in urgent care 2/15 for URI symptoms - diagnosed with viral infection   Strep, flu, covid were negative   Still with some sinus congestion, has had bloody noses; mild cough   Finally starting to feel better now - she is improving   Taking allegra and flonase (stopped it 2 days ago)   Daughter has flu A currently     She has had left shoulder pain for \"a while\"  Did PT last summer- told her she had a displaced rib  Symptoms returned last week after carrying around her 6 year old   Trying to do home PT exercises   Heat at night  Taking ibuprofen 600 mg at bedtime   No falls or injuries   No xrays     Current Outpatient Medications   Medication Sig Dispense Refill    famotidine (Pepcid) 10 mg tablet Take 1 tablet (10 mg) by mouth once daily. (Patient taking differently: Take 4 tablets (40 mg) by mouth once daily.)      Maria Fe 1.5/30, 28, 1.5 mg-30 mcg (21)/75 mg (7) tablet TAKE 1 TABLET BY MOUTH EVERY DAY FOR 28 DAYS      sulfaSALAzine (Azulfidine) 500 mg tablet Take 3 tablets (1,500 mg) by mouth 2 times a day.      valACYclovir (Valtrex) 500 mg tablet 2 TABLETS ORALLY TWICE A DAY , AS NEEDED 5 DAYS      cyclobenzaprine (Flexeril) 5 mg tablet Take 1 tablet (5 mg) by mouth 3 times a day as needed for muscle spasms. 30 tablet 0     Current Facility-Administered Medications   Medication Dose Route Frequency Provider Last Rate Last Admin    cyanocobalamin (Vitamin B-12) injection 1,000 mcg  1,000 mcg intramuscular q30 days Myke Osborne MD   1,000 mcg at 02/04/25 0939       Review of Systems   Constitutional:  Negative for chills and fever.   HENT:  Positive for congestion. Negative for " ear pain and sore throat.    Respiratory:  Positive for cough. Negative for shortness of breath.    Cardiovascular:  Negative for chest pain.   Musculoskeletal:  Positive for arthralgias and neck pain. Negative for joint swelling.   Skin:  Negative for color change and rash.       Objective   /72 (BP Location: Right arm, Patient Position: Sitting, BP Cuff Size: Large adult)   Pulse 81   Temp 36.7 °C (98 °F) (Temporal)   Resp 12   Wt 78.8 kg (173 lb 12.8 oz)   LMP 01/28/2025 (Approximate)   SpO2 98%   BMI 28.92 kg/m²     Physical Exam    Constitutional: Well developed, well nourished, alert and in no acute distress.  Head and Face: NC/AT  Eyes: Normal external exam. Pupils equally round and reactive to light with normal accommodation and extraocular movements intact.  ENT: External inspection of ears normal, tympanic membranes visualized and normal. Nasal mucosa and turbinates normal, no nasal discharge present. Oral mucosa moist, oropharynx clear without tonsillar exudate or erythema. +PND   Neck: Supple. No cervical lymphadenopathy   Cardiovascular: Regular rate and rhythm, normal S1 and S2, no murmurs, gallops, or rubs.   Pulmonary: No respiratory distress, lungs clear to auscultation bilaterally. No wheezes, rhonchi, rales.  SHOULDER: Normal visual inspection, no erythema, warmth, or swelling. Muscles symmetric, no atrophy noted. Normal range of motion in abduction, flexion, extension, internal and external rotation. Muscle strength +5/5 in abduction, flexion, internal and external rotation. No tenderness to palpation. Negative Empty Can test. +Neer's impingement, Hawkin's test. Normal range of motion of cervical spine. Normal sensation of upper extremities bilaterally. Normal radial pulses bilaterally.   Skin: Warm, well perfused, normal skin turgor and color.   Neurologic: Cranial nerves II-XII grossly intact.    Assessment/Plan     Problem List Items Addressed This Visit    None  Visit Diagnoses        Tendinitis of left rotator cuff    -  Primary  Xray ordered   Ibuprofen, tylenol discussed  Home exercises provided  Consider repeat course of PT   Consider steroid injection in office     Chronic left shoulder pain        Relevant Medications    cyclobenzaprine (Flexeril) 5 mg tablet    Other Relevant Orders    XR shoulder left 2+ views    Exposure to influenza        Viral upper respiratory illness      Symptoms are improving           Catia Zaman, DO  2/25/2025

## 2025-02-25 ENCOUNTER — CLINICAL SUPPORT (OUTPATIENT)
Dept: PRIMARY CARE | Facility: CLINIC | Age: 45
End: 2025-02-25
Payer: COMMERCIAL

## 2025-02-25 ENCOUNTER — OFFICE VISIT (OUTPATIENT)
Dept: PRIMARY CARE | Facility: CLINIC | Age: 45
End: 2025-02-25
Payer: COMMERCIAL

## 2025-02-25 ENCOUNTER — APPOINTMENT (OUTPATIENT)
Dept: PRIMARY CARE | Facility: CLINIC | Age: 45
End: 2025-02-25
Payer: COMMERCIAL

## 2025-02-25 VITALS
RESPIRATION RATE: 12 BRPM | BODY MASS INDEX: 28.92 KG/M2 | HEART RATE: 81 BPM | WEIGHT: 173.8 LBS | TEMPERATURE: 98 F | OXYGEN SATURATION: 98 % | SYSTOLIC BLOOD PRESSURE: 110 MMHG | DIASTOLIC BLOOD PRESSURE: 72 MMHG

## 2025-02-25 DIAGNOSIS — J06.9 VIRAL UPPER RESPIRATORY ILLNESS: ICD-10-CM

## 2025-02-25 DIAGNOSIS — G89.29 CHRONIC LEFT SHOULDER PAIN: ICD-10-CM

## 2025-02-25 DIAGNOSIS — Z20.828 EXPOSURE TO INFLUENZA: ICD-10-CM

## 2025-02-25 DIAGNOSIS — M75.82 TENDINITIS OF LEFT ROTATOR CUFF: Primary | ICD-10-CM

## 2025-02-25 DIAGNOSIS — M25.512 CHRONIC LEFT SHOULDER PAIN: ICD-10-CM

## 2025-02-25 PROCEDURE — 99213 OFFICE O/P EST LOW 20 MIN: CPT | Performed by: FAMILY MEDICINE

## 2025-02-25 PROCEDURE — 1036F TOBACCO NON-USER: CPT | Performed by: FAMILY MEDICINE

## 2025-02-25 PROCEDURE — 96372 THER/PROPH/DIAG INJ SC/IM: CPT | Performed by: FAMILY MEDICINE

## 2025-02-25 RX ORDER — CYCLOBENZAPRINE HCL 5 MG
5 TABLET ORAL 3 TIMES DAILY PRN
Qty: 30 TABLET | Refills: 0 | Status: SHIPPED | OUTPATIENT
Start: 2025-02-25 | End: 2025-04-26

## 2025-02-25 RX ADMIN — CYANOCOBALAMIN 1000 MCG: 1000 INJECTION, SOLUTION INTRAMUSCULAR; SUBCUTANEOUS at 12:20

## 2025-02-25 ASSESSMENT — ENCOUNTER SYMPTOMS
COUGH: 1
FEVER: 0
CHILLS: 0
JOINT SWELLING: 0
NECK PAIN: 1
SHORTNESS OF BREATH: 0
COLOR CHANGE: 0
SORE THROAT: 0
ARTHRALGIAS: 1

## 2025-02-26 ENCOUNTER — HOSPITAL ENCOUNTER (OUTPATIENT)
Dept: RADIOLOGY | Facility: CLINIC | Age: 45
Discharge: HOME | End: 2025-02-26
Payer: COMMERCIAL

## 2025-02-26 DIAGNOSIS — G89.29 CHRONIC LEFT SHOULDER PAIN: ICD-10-CM

## 2025-02-26 DIAGNOSIS — M25.512 CHRONIC LEFT SHOULDER PAIN: ICD-10-CM

## 2025-02-26 PROCEDURE — 73030 X-RAY EXAM OF SHOULDER: CPT | Mod: LT

## 2025-03-07 ENCOUNTER — APPOINTMENT (OUTPATIENT)
Dept: NUTRITION | Facility: CLINIC | Age: 45
End: 2025-03-07
Payer: COMMERCIAL

## 2025-03-10 ENCOUNTER — APPOINTMENT (OUTPATIENT)
Dept: PRIMARY CARE | Facility: CLINIC | Age: 45
End: 2025-03-10
Payer: COMMERCIAL

## 2025-06-06 ENCOUNTER — TELEPHONE (OUTPATIENT)
Dept: PRIMARY CARE | Facility: CLINIC | Age: 45
End: 2025-06-06

## 2025-06-06 NOTE — TELEPHONE ENCOUNTER
Patient called in and wanted to get her B-12 injection. The B-12 injection was order back in January and the last 2 times the patient got her labs done her B-12 was normal. Dose patient need to get the lab work done before she schedule an appt for the B-12 injection or can we just schedule her an appt for her B-12 injection ?    Please call patient back at [phone].       Please and thank you

## 2025-06-10 ENCOUNTER — APPOINTMENT (OUTPATIENT)
Dept: PRIMARY CARE | Facility: CLINIC | Age: 45
End: 2025-06-10
Payer: COMMERCIAL

## 2025-06-10 PROCEDURE — 96372 THER/PROPH/DIAG INJ SC/IM: CPT | Performed by: FAMILY MEDICINE

## 2025-06-10 RX ADMIN — CYANOCOBALAMIN 1000 MCG: 1000 INJECTION, SOLUTION INTRAMUSCULAR; SUBCUTANEOUS at 08:00

## 2025-07-08 ENCOUNTER — APPOINTMENT (OUTPATIENT)
Dept: PRIMARY CARE | Facility: CLINIC | Age: 45
End: 2025-07-08
Payer: COMMERCIAL

## 2025-07-08 PROCEDURE — 96372 THER/PROPH/DIAG INJ SC/IM: CPT | Performed by: FAMILY MEDICINE

## 2025-07-08 RX ADMIN — CYANOCOBALAMIN 1000 MCG: 1000 INJECTION, SOLUTION INTRAMUSCULAR; SUBCUTANEOUS at 08:14

## 2025-07-17 ENCOUNTER — APPOINTMENT (OUTPATIENT)
Dept: NEUROLOGY | Facility: HOSPITAL | Age: 45
End: 2025-07-17
Payer: COMMERCIAL

## 2025-08-05 ENCOUNTER — APPOINTMENT (OUTPATIENT)
Dept: PRIMARY CARE | Facility: CLINIC | Age: 45
End: 2025-08-05
Payer: COMMERCIAL

## 2025-08-05 PROCEDURE — 96372 THER/PROPH/DIAG INJ SC/IM: CPT | Performed by: FAMILY MEDICINE

## 2025-08-05 RX ADMIN — CYANOCOBALAMIN 1000 MCG: 1000 INJECTION, SOLUTION INTRAMUSCULAR; SUBCUTANEOUS at 08:08

## 2025-09-02 ENCOUNTER — APPOINTMENT (OUTPATIENT)
Dept: PRIMARY CARE | Facility: CLINIC | Age: 45
End: 2025-09-02
Payer: COMMERCIAL

## 2025-10-09 ENCOUNTER — APPOINTMENT (OUTPATIENT)
Dept: PRIMARY CARE | Facility: CLINIC | Age: 45
End: 2025-10-09
Payer: COMMERCIAL